# Patient Record
Sex: MALE | Race: BLACK OR AFRICAN AMERICAN | Employment: OTHER | URBAN - METROPOLITAN AREA
[De-identification: names, ages, dates, MRNs, and addresses within clinical notes are randomized per-mention and may not be internally consistent; named-entity substitution may affect disease eponyms.]

---

## 2017-01-17 ENCOUNTER — OFFICE VISIT (OUTPATIENT)
Dept: FAMILY MEDICINE CLINIC | Age: 62
End: 2017-01-17

## 2017-01-17 VITALS
RESPIRATION RATE: 14 BRPM | HEART RATE: 88 BPM | BODY MASS INDEX: 38.31 KG/M2 | HEIGHT: 66 IN | TEMPERATURE: 98.3 F | DIASTOLIC BLOOD PRESSURE: 52 MMHG | OXYGEN SATURATION: 100 % | SYSTOLIC BLOOD PRESSURE: 113 MMHG | WEIGHT: 238.4 LBS

## 2017-01-17 DIAGNOSIS — I10 ESSENTIAL HYPERTENSION: ICD-10-CM

## 2017-01-17 DIAGNOSIS — E11.59 TYPE 2 DIABETES MELLITUS WITH OTHER CIRCULATORY COMPLICATION, WITH LONG-TERM CURRENT USE OF INSULIN (HCC): Primary | ICD-10-CM

## 2017-01-17 DIAGNOSIS — Z79.4 TYPE 2 DIABETES MELLITUS WITH OTHER CIRCULATORY COMPLICATION, WITH LONG-TERM CURRENT USE OF INSULIN (HCC): Primary | ICD-10-CM

## 2017-01-17 LAB — HBA1C MFR BLD HPLC: 4.9 %

## 2017-01-17 NOTE — PROGRESS NOTES
HISTORY OF PRESENT ILLNESS  Roxanna Gutiérrez is a 64 y.o. male. HPI In for recheck. Needs refill of levemir. Needs diabetes checked. Blood sugars have been in the low 100s. NO hypogylcemic episodes. Sores on legs have healed up. ROS    Physical Exam   Constitutional: He appears well-developed and well-nourished. HENT:   Right Ear: External ear normal.   Left Ear: External ear normal.   Mouth/Throat: Oropharynx is clear and moist.   Neck: No thyromegaly present. Cardiovascular: Normal rate, regular rhythm, normal heart sounds and intact distal pulses. Pulmonary/Chest: Effort normal and breath sounds normal. No respiratory distress. He has no wheezes. Abdominal: Soft. Bowel sounds are normal. He exhibits no distension and no mass. There is no tenderness. There is no guarding. Musculoskeletal: Normal range of motion. He exhibits no edema. Lymphadenopathy:     He has no cervical adenopathy. Nursing note and vitals reviewed. ASSESSMENT and PLAN  Orders Placed This Encounter    CBC WITH AUTOMATED DIFF    METABOLIC PANEL, COMPREHENSIVE    LIPID PANEL    AMB POC HEMOGLOBIN A1C     DIABETES FOOT EXAM    insulin detemir (LEVEMIR FLEXTOUCH) 100 unit/mL (3 mL) inpn    pneumococcal 13 clay conj dip (PREVNAR-13) 0.5 mL syrg injection    insulin detemir (LEVEMIR FLEXTOUCH) 100 unit/mL (3 mL) inpn     Larry was seen today for hypertension, diabetes and cholesterol problem. Diagnoses and all orders for this visit:    Type 2 diabetes mellitus with other circulatory complication, with long-term current use of insulin ([facility])  -     CBC WITH AUTOMATED DIFF  -     METABOLIC PANEL, COMPREHENSIVE  -     LIPID PANEL  -     AMB POC HEMOGLOBIN A1C  -     Cancel: MICROALBUMIN, UR, RAND W/ MICROALBUMIN/CREA RATIO  -      DIABETES FOOT EXAM    Essential hypertension    Other orders  -     insulin detemir (LEVEMIR FLEXTOUCH) 100 unit/mL (3 mL) inpn; 3 units if blood sugar over 150.   -     pneumococcal 13 clay conj dip (PREVNAR-13) 0.5 mL syrg injection; 0.5 mL by IntraMUSCular route once for 1 dose. -     insulin detemir (LEVEMIR FLEXTOUCH) 100 unit/mL (3 mL) inpn; 3 units sc if blood sugar over 150      Follow-up Disposition:  Return in about 6 months (around 7/17/2017).

## 2017-01-17 NOTE — PROGRESS NOTES
Chief Complaint   Patient presents with    Hypertension    Diabetes    Cholesterol Problem     1. Have you been to the ER, urgent care clinic since your last visit? Hospitalized since your last visit? No    2. Have you seen or consulted any other health care providers outside of the [address] [place] [address] since your last visit? Include any pap smears or colon screening.  No  [facility]    Health Maintenance Due   Topic Date Due    Hepatitis C Screening  1955    Pneumococcal 19-64 Highest Risk (2 of 3 - PCV13) 01/01/2010    ZOSTER VACCINE AGE 60>  08/05/2015    EYE EXAM RETINAL OR DILATED Q1  03/19/2016    MICROALBUMIN Q1  05/27/2016    LIPID PANEL Q1  05/27/2016    HEMOGLOBIN A1C Q6M  02/01/2017

## 2017-01-17 NOTE — MR AVS SNAPSHOT
Visit Information Date & Time Provider Department Dept. Phone Encounter #  
 1/17/2017  2:00 PM April Snyder MD [facility] [phone] [id number] Follow-up Instructions Return in about 6 months (around 7/17/2017). Upcoming Health Maintenance Date Due Hepatitis C Screening 1955 Pneumococcal 19-64 Highest Risk (2 of 3 - PCV13) 1/1/2010 ZOSTER VACCINE AGE 60> 8/5/2015 EYE EXAM RETINAL OR DILATED Q1 3/19/2016 MICROALBUMIN Q1 5/27/2016 LIPID PANEL Q1 5/27/2016 HEMOGLOBIN A1C Q6M 2/1/2017 GLAUCOMA SCREENING Q2Y 3/19/2017 FOOT EXAM Q1 6/21/2017 MEDICARE YEARLY EXAM 6/22/2017 DTaP/Tdap/Td series (2 - Td) 4/11/2026 COLONOSCOPY 4/16/2026 Allergies as of 1/17/2017  Review Complete On: 1/17/2017 By: April Snyder MD  
  
 Severity Noted Reaction Type Reactions Clonidine  06/01/2015    Other (comments)  
 fatigue Lisinopril  10/18/2011   Side Effect Other (comments)  
 headache Losartan  08/05/2011    Other (comments) Fatigue Statins-hmg-coa Reductase Inhibitors  02/04/2016    Other (comments)  
 transaminitis Welchol [Colesevelam]  08/05/2011   Side Effect Other (comments) Fatigue Zocor [Simvastatin]  08/13/2010    Myalgia  
 headache Current Immunizations  Reviewed on 6/21/2016 Name Date Influenza Vaccine 11/7/2013 Influenza Vaccine (Quad) PF 9/12/2016, 2/4/2016, 12/29/2014 Influenza Vaccine Split 11/2/2012, 12/13/2010 Influenza Vaccine Whole 9/16/2011 Pneumococcal Vaccine (Unspecified Type) 1/1/2009 Tdap 4/11/2016 Not reviewed this visit You Were Diagnosed With   
  
 Codes Comments Type 2 diabetes mellitus with other circulatory complication, with long-term current use of insulin (HCC)    -  Primary ICD-10-CM: E11.59, Z79.4 Essential hypertension     ICD-10-CM: I10 
ICD-9-CM: 401.9 Vitals BP Pulse Temp Resp Height(growth percentile) Weight(growth percentile) 113/52 88 98.3 °F (36.8 °C) (Oral) 14 5' 6\" (1.676 m) 238 lb 6.4 oz (108.1 kg) SpO2 BMI Smoking Status 100% 38.48 kg/m2 Never Smoker Vitals History BMI and BSA Data Body Mass Index Body Surface Area  
 38.48 kg/m 2 2.24 m 2 Preferred Pharmacy Pharmacy Name Phone [facility] [address] [place] [address], [facility] [address] [phone] Your Updated Medication List  
  
   
This list is accurate as of: 1/17/17  2:39 PM.  Always use your most recent med list. amLODIPine 10 mg tablet Commonly known as:  Ivy Croon Take 1 Tab by mouth daily. For blood pressure Artificial Tears(Glycerin-PEG) 1-0.3 % Drop Generic drug:  propylene glycol-glycerin  
  
 aspirin 81 mg chewable tablet  
  
 atorvastatin 20 mg tablet Commonly known as:  LIPITOR Take 1 Tab by mouth daily. Blood-Glucose Meter Misc  
1 Each by Does Not Apply route Before breakfast and dinner. One touch Meter  
  
 cloNIDine HCl 0.1 mg tablet Commonly known as:  CATAPRES  
TAKE 1 TABLET BY MOUTH TWICE DAILY FOR BLOOD PRESSURE  
  
 clopidogrel 75 mg Tab Commonly known as:  PLAVIX Take 1 Tab by mouth daily. collagenase 250 unit/gram ointment Commonly known as:  SANTYL Apply  to affected area daily. glucose blood VI test strips strip Commonly known as:  ASCENSIA AUTODISC VI, ONE TOUCH ULTRA TEST VI Check blood sugar daily * insulin detemir 100 unit/mL (3 mL) Inpn Commonly known as:  [place]  
3 units if blood sugar over 150. * insulin detemir 100 unit/mL (3 mL) Inpn Commonly known as:  LEVEMIR FLEXTOUCH  
3 units sc if blood sugar over 150 Insulin Needles (Disposable) 32 gauge x 5/32\" Ndle Commonly known as:  Kim Pen Needle BD ultra-fine III short pen needle 31G (BLUE)  Use as directed. Insulin Needles (Disposable) 31 gauge x \" Ndle Commonly known as:  BD INSULIN PEN NEEDLE UF SHORT  
USE AS DIRECTED Lancets Misc Check blood sugar twice a day LYRICA 75 mg capsule Generic drug:  pregabalin TAKE 1 CAPSULE BY MOUTH DAILY  
  
 metoprolol succinate 25 mg XL tablet Commonly known as:  TOPROL-XL Take 1 Tab by mouth daily. metoprolol tartrate 25 mg tablet Commonly known as:  LOPRESSOR  
1 po bid NEPHRO-MERON 0.8 mg Tab tablet Generic drug:  b complex-vitamin c-folic acid 0.8 mg  
  
 pneumococcal 13 clay conj dip 0.5 mL Syrg injection Commonly known as:  PREVNAR-13  
0.5 mL by IntraMUSCular route once for 1 dose. * Notice: This list has 2 medication(s) that are the same as other medications prescribed for you. Read the directions carefully, and ask your doctor or other care provider to review them with you. Prescriptions Printed Refills  
 pneumococcal 13 clay conj dip (PREVNAR-13) 0.5 mL syrg injection 0 Si.5 mL by IntraMUSCular route once for 1 dose. Class: Print Route: IntraMUSCular Prescriptions Sent to Pharmacy Refills  
 insulin detemir (LEVEMIR FLEXTOUCH) 100 unit/mL (3 mL) inpn 1 Sig: 3 units if blood sugar over 150. Class: Normal  
 Pharmacy: [facility] [address] [place] [address] Ph #: [phone] insulin detemir (LEVEMIR FLEXTOUCH) 100 unit/mL (3 mL) inpn 12 Sig: 3 units sc if blood sugar over 150 Class: Normal  
 Pharmacy: [facility] [address] [place] [address] Ph #: [phone] We Performed the Following AMB POC HEMOGLOBIN A1C [51364 CPT(R)] CBC WITH AUTOMATED DIFF [58357 CPT(R)]  DIABETES FOOT EXAM [7 Custom] LIPID PANEL [57251 CPT(R)] METABOLIC PANEL, COMPREHENSIVE [31302 CPT(R)] MICROALBUMIN, UR, RAND W/ MICROALBUMIN/CREA RATIO G668171 CPT(R)] Follow-up Instructions Return in about 6 months (around 7/17/2017). Introducing [facility]! [facility] introduces [employer] patient portal. Now you can access parts of your medical record, email your doctor's office, and request medication refills online. 1. In your internet browser, go to [email or web address] 2. Click on the First Time User? Click Here link in the Sign In box. You will see the New Member Sign Up page. 3. Enter your Startups Access Code exactly as it appears below. You will not need to use this code after youve completed the sign-up process. If you do not sign up before the expiration date, you must request a new code. · Startups Access Code: [email or web address] Expires: 4/17/2017  2:37 PM 
 
4. Enter the last four digits of your Social Security Number (xxxx) and Date of Birth (mm/dd/yyyy) as indicated and click Submit. You will be taken to the next sign-up page. 5. Create a Startups ID. This will be your Startups login ID and cannot be changed, so think of one that is secure and easy to remember. 6. Create a Startups password. You can change your password at any time. 7. Enter your Password Reset Question and Answer. This can be used at a later time if you forget your password. 8. Enter your e-mail address. You will receive e-mail notification when new information is available in [address]. 9. Click Sign Up. You can now view and download portions of your medical record. 10. Click the Download Summary menu link to download a portable copy of your medical information. If you have questions, please visit the Frequently Asked Questions section of the Startups website. Remember, Startups is NOT to be used for urgent needs. For medical emergencies, dial 911. Now available from your iPhone and Android! Please provide this summary of care documentation to your next provider. Your primary care clinician is listed as Jodeane Spatz. If you have any questions after today's visit, please call [phone].

## 2017-01-18 LAB
ALBUMIN SERPL-MCNC: 4.8 G/DL (ref 3.6–4.8)
ALBUMIN/GLOB SERPL: 1.6 {RATIO} (ref 1.1–2.5)
ALP SERPL-CCNC: 57 IU/L (ref 39–117)
ALT SERPL-CCNC: 12 IU/L (ref 0–44)
AST SERPL-CCNC: 11 IU/L (ref 0–40)
BASOPHILS # BLD AUTO: 0 X10E3/UL (ref 0–0.2)
BASOPHILS NFR BLD AUTO: 1 %
BILIRUB SERPL-MCNC: 0.3 MG/DL (ref 0–1.2)
BUN SERPL-MCNC: 60 MG/DL (ref 8–27)
BUN/CREAT SERPL: 8 (ref 10–22)
CALCIUM SERPL-MCNC: 9 MG/DL (ref 8.6–10.2)
CHLORIDE SERPL-SCNC: 97 MMOL/L (ref 96–106)
CHOLEST SERPL-MCNC: 182 MG/DL (ref 100–199)
CO2 SERPL-SCNC: 22 MMOL/L (ref 18–29)
CREAT SERPL-MCNC: 7.99 MG/DL (ref 0.76–1.27)
EOSINOPHIL # BLD AUTO: 0.2 X10E3/UL (ref 0–0.4)
EOSINOPHIL NFR BLD AUTO: 4 %
ERYTHROCYTE [DISTWIDTH] IN BLOOD BY AUTOMATED COUNT: 12.9 % (ref 12.3–15.4)
GLOBULIN SER CALC-MCNC: 3 G/DL (ref 1.5–4.5)
GLUCOSE SERPL-MCNC: 87 MG/DL (ref 65–99)
HCT VFR BLD AUTO: 34.4 % (ref 37.5–51)
HDLC SERPL-MCNC: 41 MG/DL
HGB BLD-MCNC: 11.4 G/DL (ref 12.6–17.7)
IMM GRANULOCYTES # BLD: 0 X10E3/UL (ref 0–0.1)
IMM GRANULOCYTES NFR BLD: 0 %
INTERPRETATION, 910389: NORMAL
INTERPRETATION: NORMAL
LDLC SERPL CALC-MCNC: 117 MG/DL (ref 0–99)
LYMPHOCYTES # BLD AUTO: 2.3 X10E3/UL (ref 0.7–3.1)
LYMPHOCYTES NFR BLD AUTO: 40 %
MCH RBC QN AUTO: 33.5 PG (ref 26.6–33)
MCHC RBC AUTO-ENTMCNC: 33.1 G/DL (ref 31.5–35.7)
MCV RBC AUTO: 101 FL (ref 79–97)
MONOCYTES # BLD AUTO: 0.6 X10E3/UL (ref 0.1–0.9)
MONOCYTES NFR BLD AUTO: 10 %
NEUTROPHILS # BLD AUTO: 2.6 X10E3/UL (ref 1.4–7)
NEUTROPHILS NFR BLD AUTO: 45 %
PLATELET # BLD AUTO: 208 X10E3/UL (ref 150–379)
POTASSIUM SERPL-SCNC: 6.2 MMOL/L (ref 3.5–5.2)
PROT SERPL-MCNC: 7.8 G/DL (ref 6–8.5)
RBC # BLD AUTO: 3.4 X10E6/UL (ref 4.14–5.8)
SODIUM SERPL-SCNC: 142 MMOL/L (ref 134–144)
TRIGL SERPL-MCNC: 120 MG/DL (ref 0–149)
VLDLC SERPL CALC-MCNC: 24 MG/DL (ref 5–40)
WBC # BLD AUTO: 5.7 X10E3/UL (ref 3.4–10.8)

## 2017-01-20 NOTE — PROGRESS NOTES
pc with pt. Potassium has been a problem, was told this 4 days ago at dialysis. Renal is managing this.

## 2017-01-24 ENCOUNTER — TELEPHONE (OUTPATIENT)
Dept: FAMILY MEDICINE CLINIC | Age: 62
End: 2017-01-24

## 2017-07-18 ENCOUNTER — OFFICE VISIT (OUTPATIENT)
Dept: FAMILY MEDICINE CLINIC | Age: 62
End: 2017-07-18

## 2017-07-18 ENCOUNTER — PATIENT OUTREACH (OUTPATIENT)
Dept: FAMILY MEDICINE CLINIC | Age: 62
End: 2017-07-18

## 2017-07-18 VITALS
WEIGHT: 243.4 LBS | DIASTOLIC BLOOD PRESSURE: 47 MMHG | HEART RATE: 78 BPM | TEMPERATURE: 98 F | BODY MASS INDEX: 39.12 KG/M2 | HEIGHT: 66 IN | OXYGEN SATURATION: 99 % | RESPIRATION RATE: 14 BRPM | SYSTOLIC BLOOD PRESSURE: 101 MMHG

## 2017-07-18 DIAGNOSIS — Z13.39 SCREENING FOR ALCOHOLISM: ICD-10-CM

## 2017-07-18 DIAGNOSIS — Z79.4 TYPE 2 DIABETES MELLITUS WITH OTHER CIRCULATORY COMPLICATION, WITH LONG-TERM CURRENT USE OF INSULIN (HCC): Primary | ICD-10-CM

## 2017-07-18 DIAGNOSIS — Z00.00 ROUTINE GENERAL MEDICAL EXAMINATION AT A HEALTH CARE FACILITY: ICD-10-CM

## 2017-07-18 DIAGNOSIS — E11.59 TYPE 2 DIABETES MELLITUS WITH OTHER CIRCULATORY COMPLICATION, WITH LONG-TERM CURRENT USE OF INSULIN (HCC): Primary | ICD-10-CM

## 2017-07-18 DIAGNOSIS — Z11.59 NEED FOR HEPATITIS C SCREENING TEST: ICD-10-CM

## 2017-07-18 LAB — HBA1C MFR BLD HPLC: 5.3 %

## 2017-07-18 RX ORDER — GUAIFENESIN 100 MG/5ML
162 LIQUID (ML) ORAL DAILY
Qty: 60 TAB | Refills: 12
Start: 2017-07-18

## 2017-07-18 RX ORDER — ATORVASTATIN CALCIUM 40 MG/1
TABLET, FILM COATED ORAL
Refills: 3 | COMMUNITY
Start: 2017-06-26 | End: 2017-09-06 | Stop reason: SDUPTHER

## 2017-07-18 RX ORDER — SEVELAMER CARBONATE 2400 MG/1
POWDER, FOR SUSPENSION ORAL
Refills: 1 | COMMUNITY
Start: 2017-06-12 | End: 2018-01-16 | Stop reason: ALTCHOICE

## 2017-07-18 RX ORDER — SODIUM POLYSTYRENE SULFONATE 15 G/60ML
SUSPENSION ORAL; RECTAL
Refills: 0 | COMMUNITY
Start: 2017-04-14 | End: 2018-01-16 | Stop reason: ALTCHOICE

## 2017-07-18 RX ORDER — ASPIRIN 81 MG/1
TABLET ORAL
Refills: 11 | COMMUNITY
Start: 2017-04-13 | End: 2017-07-18 | Stop reason: DRUGHIGH

## 2017-07-18 RX ORDER — PREGABALIN 150 MG/1
CAPSULE ORAL
Qty: 60 CAP | Refills: 5 | Status: SHIPPED | OUTPATIENT
Start: 2017-07-18 | End: 2018-01-30 | Stop reason: SDUPTHER

## 2017-07-18 NOTE — PROGRESS NOTES
Patient stated he had Prevnar 13 and Zoster Vaccine completed [address]   Had Prevnar 13 4/3/2017 and no record of Zoster Vaccine being given

## 2017-07-18 NOTE — PROGRESS NOTES
HISTORY OF PRESENT ILLNESS  Ana Bhatt is a 64 y.o. male. HPI In for diabetes check. Has been feeling well. Has gained 5 lbs since last seen. No complaints of chest pain, shortness of breath, TIAs, claudication or edema. Still taking dialysis 3 days a week. Still has numbness in feet, not helped that much by Lyrica. Also has numbness in fingertips. ROS    Physical Exam   Constitutional: He appears well-developed and well-nourished. HENT:   Right Ear: External ear normal.   Left Ear: External ear normal.   Mouth/Throat: Oropharynx is clear and moist.   Neck: No thyromegaly present. Cardiovascular: Normal rate, regular rhythm, normal heart sounds and intact distal pulses. Pulmonary/Chest: Effort normal and breath sounds normal. No respiratory distress. He has no wheezes. Abdominal: Soft. Bowel sounds are normal. He exhibits no distension and no mass. There is no tenderness. There is no guarding. Musculoskeletal: Normal range of motion. He exhibits no edema. Lymphadenopathy:     He has no cervical adenopathy. Nursing note and vitals reviewed. ASSESSMENT and PLAN  Orders Placed This Encounter    CBC WITH AUTOMATED DIFF    METABOLIC PANEL, COMPREHENSIVE    LIPID PANEL    HEPATITIS C AB    AMB POC HEMOGLOBIN A1C     DIABETES FOOT EXAM    pregabalin (LYRICA) 150 mg capsule    aspirin 81 mg chewable tablet     Larry was seen today for hypertension, diabetes, cholesterol problem and annual wellness visit.     Diagnoses and all orders for this visit:    Type 2 diabetes mellitus with other circulatory complication, with long-term current use of insulin (HCC)  -     AMB POC HEMOGLOBIN A1C  -     CBC WITH AUTOMATED DIFF  -     METABOLIC PANEL, COMPREHENSIVE  -      DIABETES FOOT EXAM  -     LIPID PANEL    Need for hepatitis C screening test  -     HEPATITIS C AB    Routine general medical examination at a health care facility    Screening for alcoholism    Other orders  -     pregabalin (LYRICA) 150 mg capsule; One tab daily for 2 weeks, then one tab twice daily. For pain in feet  -     aspirin 81 mg chewable tablet; Take 2 Tabs by mouth daily. Follow-up Disposition:  Return in about 3 months (around 10/18/2017).

## 2017-07-18 NOTE — PROGRESS NOTES
This is a Subsequent Medicare Annual Wellness Visit providing Personalized Prevention Plan Services (PPPS) (Performed 12 months after initial AWV and PPPS )    I have reviewed the patient's medical history in detail and updated the computerized patient record. History     Past Medical History:   Diagnosis Date    CVA, old, speech/language deficit 2/4/2016    Diabetes [facility])     TYPE 2    DJD (degenerative joint disease)     KNEES    ESRD (end stage renal disease) ([facility] [address] 2/4/2016    Hypercholesterolemia     Hypertension     Proteinuria     S/P CABG (coronary artery bypass graft) 2/4/2016    Unspecified sleep apnea     DOES NOT USE CPAP      Past Surgical History:   Procedure Laterality Date    HX HIP REPLACEMENT  1/2005    left  hip    HX HIP REPLACEMENT      right hip    HX ORTHOPAEDIC      BILAT THR     Current Outpatient Prescriptions   Medication Sig Dispense Refill    aspirin delayed-release 81 mg tablet TK 1 T PO  D WITH BREAKFAST FOR 30 DAYS  11    atorvastatin (LIPITOR) 40 mg tablet TK 1 T PO HS  3    RENVELA 2.4 gram pwpk oral powder TAKE 1 PACKET WITH EACH MEAL  TID  1    LYRICA 75 mg capsule TAKE ONE CAPSULE BY MOUTH EVERY DAY AS DIRECTED 30 Cap 5    insulin detemir (LEVEMIR FLEXTOUCH) 100 unit/mL (3 mL) inpn 3 units if blood sugar over 150. 1 Pen 1    insulin detemir (LEVEMIR FLEXTOUCH) 100 unit/mL (3 mL) inpn 3 units sc if blood sugar over 150 1 Pen 12    atorvastatin (LIPITOR) 20 mg tablet Take 1 Tab by mouth daily. 30 Tab 5    metoprolol tartrate (LOPRESSOR) 25 mg tablet 1 po bid 60 Tab 5    clopidogrel (PLAVIX) 75 mg tab Take 1 Tab by mouth daily. 30 Tab 5    amLODIPine (NORVASC) 10 mg tablet Take 1 Tab by mouth daily. For blood pressure 30 Tab 12    cloNIDine HCl (CATAPRES) 0.1 mg tablet TAKE 1 TABLET BY MOUTH TWICE DAILY FOR BLOOD PRESSURE 60 Tab 5    NEPHRO-MERON 0.8 mg tab tablet   0    collagenase (SANTYL) 250 unit/gram ointment Apply  to affected area daily.  15 g 2  glucose blood VI test strips (ASCENSIA AUTODISC VI, ONE TOUCH ULTRA TEST VI) strip Check blood sugar daily 100 Strip 3    aspirin 81 mg chewable tablet   0    ARTIFICIAL TEARS,GLYCERIN-PEG, 1-0.3 % drop   0    Insulin Needles, Disposable, (BD INSULIN PEN NEEDLE UF SHORT) 31 gauge x 5/16\" ndle USE AS DIRECTED 100 Pen Needle 12    Blood-Glucose Meter misc 1 Each by Does Not Apply route Before breakfast and dinner. One touch Meter 1 Each 0    Lancets misc Check blood sugar twice a day 1 Package 11    Insulin Needles, Disposable, (LONNIE PEN NEEDLE) 32 x 5/32 \" Ndle BD ultra-fine III short pen needle 31G (BLUE)    Use as directed.  100 Each 12    KIONEX, WITH SORBITOL, 15-19.3 gram/60 mL susp   0     Allergies   Allergen Reactions    Clonidine Other (comments)     fatigue    Lisinopril Other (comments)     headache    Losartan Other (comments)     Fatigue      Statins-Hmg-Coa Reductase Inhibitors Other (comments)     transaminitis    Welchol [Colesevelam] Other (comments)     Fatigue      Zocor [Simvastatin] Myalgia     headache     Family History   Problem Relation Age of Onset    Diabetes Mother     Diabetes Father     Hypertension Father     Elevated Lipids Father     Stroke Father     Stroke Paternal Grandfather      Social History   Substance Use Topics    Smoking status: Never Smoker    Smokeless tobacco: Never Used    Alcohol use No     Patient Active Problem List   Diagnosis Code    Hypertension I10    Diabetes ([facility] [address] E11.9    Hypercholesterolemia E78.00    Neuropathy ([facility] [address] G62.9    Sleep disorder G47.9    S/P CABG (coronary artery bypass graft) Z95.1    ESRD (end stage renal disease) ([facility] [address] N18.6    CVA, old, speech/language deficit I69.328       Depression Risk Factor Screening:     PHQ over the last two weeks 7/18/2017   Little interest or pleasure in doing things Not at all   Feeling down, depressed or hopeless Not at all   Total Score PHQ 2 0     Alcohol Risk Factor Screening: On any occasion during the past 3 months, have you had more than 4 drinks containing alcohol? No    Do you average more than 14 drinks per week? No        Functional Ability and Level of Safety:     Hearing Loss   normal-to-mild    Activities of Daily Living   Partial assistance. Requires assistance with: ambulation and no ADLs    Fall Risk   No flowsheet data found. Abuse Screen   Patient is not abused    Review of Systems   Not required    Physical Examination     Evaluation of Cognitive Function:  Mood/affect:  happy  Appearance: age appropriate  Family member/caregiver input: Ms Moses RUFFIN No exam performed today, As directed by Dr Lin De La O. Patient Care Team:  Eduardo Talavera MD as PCP - [address] [place] [address] Nurse Navigator    Advice/Referrals/Counseling   Education and counseling provided:  End-of-Life planning (with patient's consent) Advanced Directives discussed with patient, given Your Right to Decide booklet and Advanced Directive paperwork to completed and bring back for chart. Assessment/Plan   As directed by Dr Lin De La O.

## 2017-07-18 NOTE — MR AVS SNAPSHOT
Visit Information Date & Time Provider Department Dept. Phone Encounter #  
 7/18/2017  3:15 PM Presley Rene MD [facility] [phone] [id number] Follow-up Instructions Return in about 3 months (around 10/18/2017). Upcoming Health Maintenance Date Due Hepatitis C Screening 1955 Pneumococcal 19-64 Highest Risk (2 of 3 - PCV13) 1/1/2010 ZOSTER VACCINE AGE 60> 8/5/2015 MICROALBUMIN Q1 5/27/2016 MEDICARE YEARLY EXAM 6/22/2017 HEMOGLOBIN A1C Q6M 7/17/2017 INFLUENZA AGE 9 TO ADULT 8/1/2017 EYE EXAM RETINAL OR DILATED Q1 1/10/2018 FOOT EXAM Q1 1/17/2018 LIPID PANEL Q1 1/17/2018 GLAUCOMA SCREENING Q2Y 1/10/2019 DTaP/Tdap/Td series (2 - Td) 4/11/2026 COLONOSCOPY 4/16/2026 Allergies as of 7/18/2017  Review Complete On: 7/18/2017 By: Presley Rene MD  
  
 Severity Noted Reaction Type Reactions Clonidine  06/01/2015    Other (comments)  
 fatigue Lisinopril  10/18/2011   Side Effect Other (comments)  
 headache Losartan  08/05/2011    Other (comments) Fatigue Statins-hmg-coa Reductase Inhibitors  02/04/2016    Other (comments)  
 transaminitis Welchol [Colesevelam]  08/05/2011   Side Effect Other (comments) Fatigue Zocor [Simvastatin]  08/13/2010    Myalgia  
 headache Current Immunizations  Reviewed on 6/21/2016 Name Date Influenza Vaccine 11/7/2013 Influenza Vaccine (Quad) PF 9/12/2016, 2/4/2016, 12/29/2014 Influenza Vaccine Split 11/2/2012, 12/13/2010 Influenza Vaccine Whole 9/16/2011 Pneumococcal Vaccine (Unspecified Type) 1/1/2009 Tdap 4/11/2016 Not reviewed this visit You Were Diagnosed With   
  
 Codes Comments Type 2 diabetes mellitus with other circulatory complication, with long-term current use of insulin (HCC)    -  Primary ICD-10-CM: E11.59, Z79.4 ICD-9-CM: 250.70, V58.67  Need for hepatitis C screening test     ICD-10-CM: Z11.59 
ICD-9-CM: V73.89   
 Routine general medical examination at a health care facility     ICD-10-CM: Z00.00 ICD-9-CM: V70.0 Screening for alcoholism     ICD-10-CM: Z13.89 ICD-9-CM: V79.1 Vitals BP Pulse Temp Resp Height(growth percentile) Weight(growth percentile) 101/47 78 98 °F (36.7 °C) (Oral) 14 5' 6\" (1.676 m) 243 lb 6.4 oz (110.4 kg) SpO2 BMI Smoking Status 99% 39.29 kg/m2 Never Smoker Vitals History BMI and BSA Data Body Mass Index Body Surface Area  
 39.29 kg/m 2 2.27 m 2 Preferred Pharmacy Pharmacy Name Phone [facility] [address] [place] [address], [facility] [address] [phone] Your Updated Medication List  
  
   
This list is accurate as of: 7/18/17  4:10 PM.  Always use your most recent med list. amLODIPine 10 mg tablet Commonly known as:  Tillman Barges Take 1 Tab by mouth daily. For blood pressure Artificial Tears(Glycerin-PEG) 1-0.3 % Drop Generic drug:  propylene glycol-glycerin  
  
 aspirin 81 mg chewable tablet Take 2 Tabs by mouth daily. atorvastatin 40 mg tablet Commonly known as:  LIPITOR TK 1 T PO HS  
  
 cloNIDine HCl 0.1 mg tablet Commonly known as:  CATAPRES  
TAKE 1 TABLET BY MOUTH TWICE DAILY FOR BLOOD PRESSURE  
  
 clopidogrel 75 mg Tab Commonly known as:  PLAVIX Take 1 Tab by mouth daily. collagenase 250 unit/gram ointment Commonly known as:  SANTYL Apply  to affected area daily. glucose blood VI test strips strip Commonly known as:  ASCENSIA AUTODISC VI, ONE TOUCH ULTRA TEST VI Check blood sugar daily KIONEX (WITH SORBITOL) 15-19.3 gram/60 mL Susp Generic drug:  sodium polystyrene sulfon-sorb  
  
 metoprolol tartrate 25 mg tablet Commonly known as:  LOPRESSOR  
1 po bid NEPHRO-MERON 0.8 mg Tab tablet Generic drug:  b complex-vitamin c-folic acid 0.8 mg  
  
 pregabalin 150 mg capsule Commonly known as:  Siobhan [place] One tab daily for 2 weeks, then one tab twice daily. For pain in feet RENVELA 2.4 gram Pwpk oral powder Generic drug:  sevelamer carbonate TAKE 1 PACKET WITH EACH MEAL  TID Prescriptions Printed Refills  
 pregabalin (LYRICA) 150 mg capsule 5 Sig: One tab daily for 2 weeks, then one tab twice daily. For pain in feet Class: Print We Performed the Following AMB POC HEMOGLOBIN A1C [13979 CPT(R)] CBC WITH AUTOMATED DIFF [28959 CPT(R)] HEPATITIS C AB [87885 CPT(R)]  DIABETES FOOT EXAM [HM7 Custom] LIPID PANEL [11103 CPT(R)] METABOLIC PANEL, COMPREHENSIVE [07433 CPT(R)] Follow-up Instructions Return in about 3 months (around 10/18/2017). Patient Instructions Medicare Part B Preventive Services Limitations Recommendation Scheduled Bone Mass Measurement 
(age 72 & older, biennial) Requires diagnosis related to osteoporosis or estrogen deficiency. Biennial benefit unless patient has history of long-term glucocorticoid tx or baseline is needed because initial test was by other method  N/A Cardiovascular Screening Blood Tests (every 5 years) Total cholesterol, HDL, Triglycerides Order as a panel if possible  Done 1/2017 Colorectal Cancer Screening 
-Fecal occult blood test (annual) -Flexible sigmoidoscopy (5y) 
-Screening colonoscopy (10y) -Barium Enema   Done 4/2016 Counseling to Prevent Tobacco Use (up to 8 sessions per year) - Counseling greater than 3 and up to 10 minutes - Counseling greater than 10 minutes Patients must be asymptomatic of tobacco-related conditions to receive as preventive service  Non smoker Diabetes Screening Tests (at least every 3 years, Medicare covers annually or at 6-month intervals for prediabetic patients) Fasting blood sugar (FBS) or glucose tolerance test (GTT) Patient must be diagnosed with one of the following: 
-Hypertension, Dyslipidemia, obesity, previous impaired FBS or GTT Or any two of the following: overweight, FH of diabetes, age ? 72, history of gestational diabetes, birth of baby weighing more than 9 pounds  Last A1C was 4.9, on 1/2017 Diabetes Self-Management Training (DSMT) (no USPSTF recommendation) Requires referral by treating physician for patient with diabetes or renal disease. 10 hours of initial DSMT session of no less than 30 minutes each in a continuous 12-month period. 2 hours of follow-up DSMT in subsequent years. N/A Glaucoma Screening (no USPSTF recommendation) Diabetes mellitus, family history, , age 48 or over,  American, age 72 or over  Done 1/2017 Human Immunodeficiency Virus (HIV) Screening (annually for increased risk patients) HIV-1 and HIV-2 by EIA, TIMO, rapid antibody test, or oral mucosa transudate Patient must be at increased risk for HIV infection per USPSTF guidelines or pregnant. Tests covered annually for patients at increased risk. Pregnant patients may receive up to 3 test during pregnancy. Not high risk Medical Nutrition Therapy (MNT) (for diabetes or renal disease not recommended schedule) Requires referral by treating physician for patient with diabetes or renal disease. Can be provided in same year as diabetes self-management training (DSMT), and CMS recommends medical nutrition therapy take place after DSMT. Up to 3 hours for initial year and 2 hours in subsequent years. N/A Prostate Cancer Screening (annually up to age 76) - Digital rectal exam (LALO) - Prostate specific antigen (PSA) Annually (age 48 or over), LALO not paid separately when covered E/M service is provided on same date Men up to age 76 may need a screening blood test for prostate cancer at certain intervals, depending on their personal and family history. This decision is between the patient and his provider. Done 6/2016 and was 0.3 Seasonal Influenza Vaccination (annually)   Fall 2017 Pneumococcal Vaccination (once after 65) States done at [facility] Hepatitis B Vaccinations (if medium/high risk) Medium/high risk factors:  End-stage renal disease, Hemophiliacs who received Factor VIII or IX concentrates, Clients of institutions for the mentally retarded, Persons who live in the same house as a HepB virus carrier, Homosexual men, Illicit injectable drug abusers. Ordered today Shingles Vaccination A shingles vaccine is also recommended once in a lifetime after age 61  Stated he had at [facility] Ultrasound Screening for Abdominal Aortic Aneurysm (AAA) (once) Patient must be referred through IPPE and not have had a screening for abdominal aortic aneurysm before under Medicare. Limited to patients who meet one of the following criteria: 
- Men who are 73-68 years old and have smoked more than 100 cigarettes in their lifetime. 
-Anyone with a FH of AAA 
-Anyone recommended for screening by USPSTF  N/A Introducing [facility] & HEALTH SERVICES! Jurgen Jean introduces [email or web address] patient portal. Now you can access parts of your medical record, email your doctor's office, and request medication refills online. 1. In your internet browser, go to [email or web address] 2. Click on the First Time User? Click Here link in the Sign In box. You will see the New Member Sign Up page. 3. Enter your [email or web address] Access Code exactly as it appears below. You will not need to use this code after youve completed the sign-up process. If you do not sign up before the expiration date, you must request a new code. · [email or web address] Access Code: [email or web address] Expires: 10/16/2017  3:54 PM 
 
4. Enter the last four digits of your Social Security Number (xxxx) and Date of Birth (mm/dd/yyyy) as indicated and click Submit. You will be taken to the next sign-up page. 5. Create a [email or web address] ID. This will be your [email or web address] login ID and cannot be changed, so think of one that is secure and easy to remember. 6. Create a Elliptic password. You can change your password at any time. 7. Enter your Password Reset Question and Answer. This can be used at a later time if you forget your password. 8. Enter your e-mail address. You will receive e-mail notification when new information is available in [address] 9. Click Sign Up. You can now view and download portions of your medical record. 10. Click the Download Summary menu link to download a portable copy of your medical information. If you have questions, please visit the Frequently Asked Questions section of the [employer] website. Remember, Ell[employer]ic is NOT to be used for urgent needs. For medical emergencies, dial 911. Now available from your iPhone and Android! Please provide this summary of care documentation to your next provider. Your primary care clinician is listed as Perri Crowder. If you have any questions after today's visit, please call [phone].

## 2017-07-18 NOTE — PROGRESS NOTES
Chief Complaint   Patient presents with    Hypertension    Diabetes    Cholesterol Problem     1. Have you been to the ER, urgent care clinic since your last visit? Hospitalized since your last visit? No    2. Have you seen or consulted any other health care providers outside of the [address] [place] [address] since your last visit? Include any pap smears or colon screening.  No   Health Maintenance Due   Topic Date Due    Hepatitis C Screening  1955    Pneumococcal 19-64 Highest Risk (2 of 3 - PCV13) 01/01/2010    ZOSTER VACCINE AGE 60>  08/05/2015    MICROALBUMIN Q1  05/27/2016    MEDICARE YEARLY EXAM  06/22/2017    HEMOGLOBIN A1C Q6M  07/17/2017

## 2017-07-18 NOTE — PATIENT INSTRUCTIONS
Medicare Part B Preventive Services Limitations Recommendation Scheduled   Bone Mass Measurement  (age 72 & older, biennial) Requires diagnosis related to osteoporosis or estrogen deficiency. Biennial benefit unless patient has history of long-term glucocorticoid tx or baseline is needed because initial test was by other method  N/A   Cardiovascular Screening Blood Tests (every 5 years)  Total cholesterol, HDL, Triglycerides Order as a panel if possible  Done 1/2017   Colorectal Cancer Screening  -Fecal occult blood test (annual)  -Flexible sigmoidoscopy (5y)  -Screening colonoscopy (10y)  -Barium Enema   Done 4/2016   Counseling to Prevent Tobacco Use (up to 8 sessions per year)  - Counseling greater than 3 and up to 10 minutes  - Counseling greater than 10 minutes Patients must be asymptomatic of tobacco-related conditions to receive as preventive service  Non smoker   Diabetes Screening Tests (at least every 3 years, Medicare covers annually or at 6-month intervals for prediabetic patients)    Fasting blood sugar (FBS) or glucose tolerance test (GTT) Patient must be diagnosed with one of the following:  -Hypertension, Dyslipidemia, obesity, previous impaired FBS or GTT  Or any two of the following: overweight, FH of diabetes, age ? 72, history of gestational diabetes, birth of baby weighing more than 9 pounds  Last A1C was 4.9, on 1/2017   Diabetes Self-Management Training (DSMT) (no USPSTF recommendation) Requires referral by treating physician for patient with diabetes or renal disease. 10 hours of initial DSMT session of no less than 30 minutes each in a continuous 12-month period. 2 hours of follow-up DSMT in subsequent years.   N/A   Glaucoma Screening (no USPSTF recommendation) Diabetes mellitus, family history, , age 48 or over,  American, age 72 or over  Done 1/2017   Human Immunodeficiency Virus (HIV) Screening (annually for increased risk patients)  HIV-1 and HIV-2 by EIA, TIMO, rapid antibody test, or oral mucosa transudate Patient must be at increased risk for HIV infection per USPSTF guidelines or pregnant. Tests covered annually for patients at increased risk. Pregnant patients may receive up to 3 test during pregnancy. Not high risk   Medical Nutrition Therapy (MNT) (for diabetes or renal disease not recommended schedule) Requires referral by treating physician for patient with diabetes or renal disease. Can be provided in same year as diabetes self-management training (DSMT), and CMS recommends medical nutrition therapy take place after DSMT. Up to 3 hours for initial year and 2 hours in subsequent years. N/A   Prostate Cancer Screening (annually up to age 76)  - Digital rectal exam (LALO)  - Prostate specific antigen (PSA) Annually (age 48 or over), LALO not paid separately when covered E/M service is provided on same date  Men up to age 76 may need a screening blood test for prostate cancer at certain intervals, depending on their personal and family history. This decision is between the patient and his provider. Done 6/2016 and was 0.3   Seasonal Influenza Vaccination (annually)   Fall 2017     Pneumococcal Vaccination (once after 72) States done at [facility]      Hepatitis B Vaccinations (if medium/high risk) Medium/high risk factors:  End-stage renal disease,  Hemophiliacs who received Factor VIII or IX concentrates, Clients of institutions for the mentally retarded, Persons who live in the same house as a HepB virus carrier, Homosexual men, Illicit injectable drug abusers. Ordered today   Shingles Vaccination A shingles vaccine is also recommended once in a lifetime after age 61  Stated he had at [address] [place] [address] for Abdominal Aortic Aneurysm (AAA) (once) Patient must be referred through [facility] and not have had a screening for abdominal aortic aneurysm before under Medicare.   Limited to patients who meet one of the following criteria:  - Men who are 73-68 years old and have smoked more than 100 cigarettes in their lifetime.  -Anyone with a FH of AAA  -Anyone recommended for screening by USPSTF  N/A

## 2017-07-19 LAB
ALBUMIN SERPL-MCNC: 4.4 G/DL (ref 3.6–4.8)
ALBUMIN/GLOB SERPL: 1.4 {RATIO} (ref 1.2–2.2)
ALP SERPL-CCNC: 75 IU/L (ref 39–117)
ALT SERPL-CCNC: 8 IU/L (ref 0–44)
AST SERPL-CCNC: 7 IU/L (ref 0–40)
BASOPHILS # BLD AUTO: 0 X10E3/UL (ref 0–0.2)
BASOPHILS NFR BLD AUTO: 1 %
BILIRUB SERPL-MCNC: 0.3 MG/DL (ref 0–1.2)
BUN SERPL-MCNC: 48 MG/DL (ref 8–27)
BUN/CREAT SERPL: 5 (ref 10–24)
CALCIUM SERPL-MCNC: 8.8 MG/DL (ref 8.6–10.2)
CHLORIDE SERPL-SCNC: 98 MMOL/L (ref 96–106)
CHOLEST SERPL-MCNC: 201 MG/DL (ref 100–199)
CO2 SERPL-SCNC: 25 MMOL/L (ref 18–29)
CREAT SERPL-MCNC: 8.81 MG/DL (ref 0.76–1.27)
EOSINOPHIL # BLD AUTO: 0.2 X10E3/UL (ref 0–0.4)
EOSINOPHIL NFR BLD AUTO: 3 %
ERYTHROCYTE [DISTWIDTH] IN BLOOD BY AUTOMATED COUNT: 13.9 % (ref 12.3–15.4)
GLOBULIN SER CALC-MCNC: 3.2 G/DL (ref 1.5–4.5)
GLUCOSE SERPL-MCNC: 86 MG/DL (ref 65–99)
HCT VFR BLD AUTO: 37.2 % (ref 37.5–51)
HCV AB S/CO SERPL IA: <0.1 S/CO RATIO (ref 0–0.9)
HDLC SERPL-MCNC: 34 MG/DL
HGB BLD-MCNC: 12.2 G/DL (ref 12.6–17.7)
IMM GRANULOCYTES # BLD: 0 X10E3/UL (ref 0–0.1)
IMM GRANULOCYTES NFR BLD: 0 %
INTERPRETATION, 910389: NORMAL
INTERPRETATION: NORMAL
LDLC SERPL CALC-MCNC: 136 MG/DL (ref 0–99)
LYMPHOCYTES # BLD AUTO: 2.5 X10E3/UL (ref 0.7–3.1)
LYMPHOCYTES NFR BLD AUTO: 36 %
Lab: NORMAL
MCH RBC QN AUTO: 33.6 PG (ref 26.6–33)
MCHC RBC AUTO-ENTMCNC: 32.8 G/DL (ref 31.5–35.7)
MCV RBC AUTO: 103 FL (ref 79–97)
MONOCYTES # BLD AUTO: 0.7 X10E3/UL (ref 0.1–0.9)
MONOCYTES NFR BLD AUTO: 11 %
NEUTROPHILS # BLD AUTO: 3.5 X10E3/UL (ref 1.4–7)
NEUTROPHILS NFR BLD AUTO: 49 %
PDF IMAGE, 910387: NORMAL
PLATELET # BLD AUTO: 225 X10E3/UL (ref 150–379)
POTASSIUM SERPL-SCNC: 6.3 MMOL/L (ref 3.5–5.2)
PROT SERPL-MCNC: 7.6 G/DL (ref 6–8.5)
RBC # BLD AUTO: 3.63 X10E6/UL (ref 4.14–5.8)
SODIUM SERPL-SCNC: 145 MMOL/L (ref 134–144)
TRIGL SERPL-MCNC: 155 MG/DL (ref 0–149)
VLDLC SERPL CALC-MCNC: 31 MG/DL (ref 5–40)
WBC # BLD AUTO: 7 X10E3/UL (ref 3.4–10.8)

## 2017-07-25 RX ORDER — ATORVASTATIN CALCIUM 40 MG/1
TABLET, FILM COATED ORAL
Refills: 3 | Status: CANCELLED | OUTPATIENT
Start: 2017-07-25

## 2017-08-01 RX ORDER — METOPROLOL TARTRATE 25 MG/1
TABLET, FILM COATED ORAL
Qty: 60 TAB | Refills: 0 | Status: SHIPPED | OUTPATIENT
Start: 2017-08-01 | End: 2017-09-16 | Stop reason: SDUPTHER

## 2017-09-06 RX ORDER — ATORVASTATIN CALCIUM 40 MG/1
TABLET, FILM COATED ORAL
Qty: 90 TAB | Refills: 3 | Status: SHIPPED | OUTPATIENT
Start: 2017-09-06 | End: 2018-01-22 | Stop reason: DRUGHIGH

## 2017-09-06 NOTE — TELEPHONE ENCOUNTER
----- Message from Chu Whiting sent at 9/6/2017  3:44 PM EDT -----  Regarding: /refill   Pt is requesting a refill for his cholesterol medication to be called into the pharm on file. Best contact [phone]. [facility]     Pt is completely out.

## 2017-09-18 RX ORDER — METOPROLOL TARTRATE 25 MG/1
TABLET, FILM COATED ORAL
Qty: 60 TAB | Refills: 0 | Status: SHIPPED | OUTPATIENT
Start: 2017-09-18 | End: 2017-10-16 | Stop reason: SDUPTHER

## 2017-10-08 RX ORDER — AMLODIPINE BESYLATE 10 MG/1
TABLET ORAL
Qty: 30 TAB | Refills: 0 | Status: SHIPPED | OUTPATIENT
Start: 2017-10-08 | End: 2017-11-04 | Stop reason: SDUPTHER

## 2017-10-18 RX ORDER — METOPROLOL TARTRATE 25 MG/1
TABLET, FILM COATED ORAL
Qty: 60 TAB | Refills: 0 | Status: SHIPPED | OUTPATIENT
Start: 2017-10-18 | End: 2017-12-03 | Stop reason: SDUPTHER

## 2017-11-06 RX ORDER — AMLODIPINE BESYLATE 10 MG/1
TABLET ORAL
Qty: 30 TAB | Refills: 0 | Status: SHIPPED | OUTPATIENT
Start: 2017-11-06 | End: 2017-12-03 | Stop reason: SDUPTHER

## 2017-12-04 RX ORDER — METOPROLOL TARTRATE 25 MG/1
TABLET, FILM COATED ORAL
Qty: 60 TAB | Refills: 0 | Status: SHIPPED | OUTPATIENT
Start: 2017-12-04 | End: 2018-01-08 | Stop reason: SDUPTHER

## 2017-12-04 RX ORDER — AMLODIPINE BESYLATE 10 MG/1
TABLET ORAL
Qty: 30 TAB | Refills: 0 | Status: SHIPPED | OUTPATIENT
Start: 2017-12-04 | End: 2018-01-08 | Stop reason: SDUPTHER

## 2018-01-16 ENCOUNTER — OFFICE VISIT (OUTPATIENT)
Dept: FAMILY MEDICINE CLINIC | Age: 63
End: 2018-01-16

## 2018-01-16 VITALS
WEIGHT: 262.8 LBS | TEMPERATURE: 97.5 F | SYSTOLIC BLOOD PRESSURE: 113 MMHG | RESPIRATION RATE: 16 BRPM | HEIGHT: 66 IN | OXYGEN SATURATION: 94 % | BODY MASS INDEX: 42.23 KG/M2 | HEART RATE: 93 BPM | DIASTOLIC BLOOD PRESSURE: 55 MMHG

## 2018-01-16 DIAGNOSIS — E78.00 HYPERCHOLESTEROLEMIA: ICD-10-CM

## 2018-01-16 DIAGNOSIS — E11.9 TYPE 2 DIABETES MELLITUS WITHOUT COMPLICATION, WITH LONG-TERM CURRENT USE OF INSULIN (HCC): Primary | ICD-10-CM

## 2018-01-16 DIAGNOSIS — Z79.4 TYPE 2 DIABETES MELLITUS WITHOUT COMPLICATION, WITH LONG-TERM CURRENT USE OF INSULIN (HCC): Primary | ICD-10-CM

## 2018-01-16 DIAGNOSIS — I10 ESSENTIAL HYPERTENSION: ICD-10-CM

## 2018-01-16 PROBLEM — E11.40 TYPE 2 DIABETES MELLITUS WITH DIABETIC NEUROPATHY (HCC): Status: ACTIVE | Noted: 2018-01-16

## 2018-01-16 PROBLEM — E11.21 TYPE 2 DIABETES MELLITUS WITH NEPHROPATHY (HCC): Status: ACTIVE | Noted: 2018-01-16

## 2018-01-16 PROBLEM — E66.01 OBESITY, MORBID (HCC): Status: ACTIVE | Noted: 2018-01-16

## 2018-01-16 LAB — HBA1C MFR BLD HPLC: 5.9 %

## 2018-01-16 RX ORDER — SUCROFERRIC OXYHYDROXIDE 500 MG/1
TABLET, CHEWABLE ORAL
Refills: 11 | COMMUNITY
Start: 2017-12-18

## 2018-01-16 RX ORDER — CLOPIDOGREL BISULFATE 75 MG/1
75 TABLET ORAL DAILY
Qty: 30 TAB | Refills: 12 | Status: SHIPPED | OUTPATIENT
Start: 2018-01-16 | End: 2018-01-30 | Stop reason: SDUPTHER

## 2018-01-16 NOTE — PROGRESS NOTES
HISTORY OF PRESENT ILLNESS  Kenneth Chavarria is a 58 y.o. male. HPI Pt. Comes in for blood pressure, cholesterol, and diabetes check. No complaints of chest pain, shortness of breath, TIAs, claudication or edema. Has been gaining some weight. Has gained 19 lbs since last seen. ROS    Physical Exam   Constitutional: He appears well-developed and well-nourished. HENT:   Right Ear: External ear normal.   Left Ear: External ear normal.   Mouth/Throat: Oropharynx is clear and moist.   Neck: No thyromegaly present. Cardiovascular: Normal rate, regular rhythm, normal heart sounds and intact distal pulses. Pulmonary/Chest: Effort normal and breath sounds normal. No respiratory distress. He has no wheezes. Abdominal: Soft. Bowel sounds are normal. He exhibits no distension and no mass. There is no tenderness. There is no guarding. Musculoskeletal: Normal range of motion. He exhibits no edema. Lymphadenopathy:     He has no cervical adenopathy. Nursing note and vitals reviewed. ASSESSMENT and PLAN  Orders Placed This Encounter    CBC WITH AUTOMATED DIFF    METABOLIC PANEL, COMPREHENSIVE    LIPID PANEL    AMB POC HEMOGLOBIN A1C     DIABETES FOOT EXAM    clopidogrel (PLAVIX) 75 mg tab     Diagnoses and all orders for this visit:    1. Type 2 diabetes mellitus without complication, with long-term current use of insulin ([facility])  -     CBC WITH AUTOMATED DIFF  -     METABOLIC PANEL, COMPREHENSIVE  -     LIPID PANEL  -     AMB POC HEMOGLOBIN A1C  -      DIABETES FOOT EXAM    2. Essential hypertension    3. Hypercholesterolemia    Other orders  -     clopidogrel (PLAVIX) 75 mg tab; Take 1 Tab by mouth daily. To prevent stroke      Follow-up Disposition:  Return in about 6 months (around 7/16/2018).

## 2018-01-16 NOTE — MR AVS SNAPSHOT
[facility] 
 
 
 [address] [facility] [address] [address] [address] 
[phone] Patient: Shaina Olson MRN: [id number] IPW:[birth year] Visit Information Date & Time Provider Department Dept. Phone Encounter #  
 1/16/2018  3:00 PM Lilliana Shipman MD [facility] [phone] [id number] Upcoming Health Maintenance Date Due HEMOGLOBIN A1C Q6M 1/18/2018 EYE EXAM RETINAL OR DILATED Q1 7/11/2018 FOOT EXAM Q1 7/18/2018 MICROALBUMIN Q1 7/18/2018 LIPID PANEL Q1 7/18/2018 MEDICARE YEARLY EXAM 7/19/2018 GLAUCOMA SCREENING Q2Y 7/11/2019 DTaP/Tdap/Td series (2 - Td) 4/11/2026 COLONOSCOPY 4/16/2026 Allergies as of 1/16/2018  Review Complete On: 1/16/2018 By: Lilliana Shipman MD  
  
 Severity Noted Reaction Type Reactions Clonidine  06/01/2015    Other (comments)  
 fatigue Lisinopril  10/18/2011   Side Effect Other (comments)  
 headache Losartan  08/05/2011    Other (comments) Fatigue Statins-hmg-coa Reductase Inhibitors  02/04/2016    Other (comments)  
 transaminitis Welchol [Colesevelam]  08/05/2011   Side Effect Other (comments) Fatigue Zocor [Simvastatin]  08/13/2010    Myalgia  
 headache Current Immunizations  Reviewed on 6/21/2016 Name Date Influenza Vaccine 10/18/2017, 11/7/2013 Influenza Vaccine (Quad) PF 9/12/2016, 2/4/2016, 12/29/2014 Influenza Vaccine Split 11/2/2012, 12/13/2010 Influenza Vaccine Whole 9/16/2011 Tdap 4/11/2016 ZZZ-RETIRED (DO NOT USE) Pneumococcal Vaccine (Unspecified Type) 1/1/2009 Not reviewed this visit You Were Diagnosed With   
  
 Codes Comments Type 2 diabetes mellitus without complication, with long-term current use of insulin (HCC)    -  Primary ICD-10-CM: E11.9, Z79.4 ICD-9-CM: 250.00, V58.67 Essential hypertension     ICD-10-CM: I10 
ICD-9-CM: 401.9 Hypercholesterolemia     ICD-10-CM: E78.00 ICD-9-CM: 272.0 Vitals BP Pulse Temp Resp Height(growth percentile) Weight(growth percentile) 113/55 93 97.5 °F (36.4 °C) (Oral) 16 5' 6\" (1.676 m) 262 lb 12.8 oz (119.2 kg) SpO2 BMI Smoking Status 94% 42.42 kg/m2 Never Smoker Vitals History BMI and BSA Data Body Mass Index Body Surface Area  
 42.42 kg/m 2 2.36 m 2 Preferred Pharmacy Pharmacy Name Phone [facility] [address], [facility] [address] [phone] Your Updated Medication List  
  
   
This list is accurate as of: 1/16/18  3:47 PM.  Always use your most recent med list. amLODIPine 10 mg tablet Commonly known as:  Rulon Love TAKE 1 TABLET BY MOUTH DAILY FOR BLOOD PRESSURE Artificial Tears(Glycerin-PEG) 1-0.3 % Drop Generic drug:  propylene glycol-glycerin  
  
 aspirin 81 mg chewable tablet Take 2 Tabs by mouth daily. atorvastatin 40 mg tablet Commonly known as:  LIPITOR TK 1 T PO HS  
  
 clopidogrel 75 mg Tab Commonly known as:  PLAVIX Take 1 Tab by mouth daily. To prevent stroke  
  
 glucose blood VI test strips strip Commonly known as:  ASCENSIA AUTODISC VI, ONE TOUCH ULTRA TEST VI Check blood sugar daily  
  
 metoprolol tartrate 25 mg tablet Commonly known as:  LOPRESSOR  
TAKE 1 TABLET BY MOUTH TWICE DAILY pregabalin 150 mg capsule Commonly known as:  Murriel Triston One tab daily for 2 weeks, then one tab twice daily. For pain in feet VELPHORO 500 mg Chew chewable tablet Generic drug:  sucroferric oxyhydroxide TK 2 TABLETS PO WITH MEALS Prescriptions Sent to Pharmacy Refills  
 clopidogrel (PLAVIX) 75 mg tab 12 Sig: Take 1 Tab by mouth daily. To prevent stroke Class: Normal  
 Pharmacy: [facility] [address], [facility] Ph #: [phone] Route: Oral  
  
We Performed the Following AMB POC HEMOGLOBIN A1C [68407 CPT(R)] CBC WITH AUTOMATED DIFF [23825 CPT(R)]  DIABETES FOOT EXAM [HM7 Custom] LIPID PANEL [09962 CPT(R)] METABOLIC PANEL, COMPREHENSIVE [24484 CPT(R)] MICROALBUMIN, UR, RAND W/ MICROALBUMIN/CREA RATIO [id number] CPT(R)] Introducing [facility] & HEALTH SERVICES! Adánmichel Mendoza introduces [employer] patient portal. Now you can access parts of your medical record, email your doctor's office, and request medication refills online. 1. In your internet browser, go to [email or web address] Left 2. Click on the First Time User? Click Here link in the Sign In box. You will see the New Member Sign Up page. 3. Enter your [employer] Access Code exactly as it appears below. You will not need to use this code after youve completed the sign-up process. If you do not sign up before the expiration date, you must request a new code. · [employer] Access Code: [email or web address] Expires: 4/16/2018  3:47 PM 
 
4. Enter the last four digits of your Social Security Number (xxxx) and Date of Birth (mm/dd/yyyy) as indicated and click Submit. You will be taken to the next sign-up page. 5. Create a Bus[employer]Event ID. This will be your [employer]Event login ID and cannot be changed, so think of one that is secure and easy to remember. 6. Create a [employer] password. You can change your password at any time. 7. Enter your Password Reset Question and Answer. This can be used at a later time if you forget your password. 8. Enter your e-mail address. You will receive e-mail notification when new information is available in [address] 9. Click Sign Up. You can now view and download portions of your medical record. 10. Click the Download Summary menu link to download a portable copy of your medical information. If you have questions, please visit the Frequently Asked Questions section of the [employer] website. Remember, [employer] is NOT to be used for urgent needs. For medical emergencies, dial 911. Now available from your iPhone and Android! Please provide this summary of care documentation to your next provider. Your primary care clinician is listed as Dwain Angulo. If you have any questions after today's visit, please call [phone].

## 2018-01-16 NOTE — PROGRESS NOTES
Chief Complaint   Patient presents with    Hypertension    Cholesterol Problem    Diabetes     1. Have you been to the ER, urgent care clinic since your last visit? Hospitalized since your last visit? No    2. Have you seen or consulted any other health care providers outside of the [address] [place] [address] since your last visit? Include any pap smears or colon screening.     No      Health Maintenance Due   Topic Date Due    HEMOGLOBIN A1C Q6M  01/18/2018

## 2018-01-17 LAB
ALBUMIN SERPL-MCNC: 4.3 G/DL (ref 3.6–4.8)
ALBUMIN/GLOB SERPL: 1.4 {RATIO} (ref 1.2–2.2)
ALP SERPL-CCNC: 70 IU/L (ref 39–117)
ALT SERPL-CCNC: 9 IU/L (ref 0–44)
AST SERPL-CCNC: 12 IU/L (ref 0–40)
BASOPHILS # BLD AUTO: 0 X10E3/UL (ref 0–0.2)
BASOPHILS NFR BLD AUTO: 1 %
BILIRUB SERPL-MCNC: 0.3 MG/DL (ref 0–1.2)
BUN SERPL-MCNC: 51 MG/DL (ref 8–27)
BUN/CREAT SERPL: 6 (ref 10–24)
CALCIUM SERPL-MCNC: 9.2 MG/DL (ref 8.6–10.2)
CHLORIDE SERPL-SCNC: 91 MMOL/L (ref 96–106)
CHOLEST SERPL-MCNC: 218 MG/DL (ref 100–199)
CO2 SERPL-SCNC: 26 MMOL/L (ref 18–29)
CREAT SERPL-MCNC: 8.48 MG/DL (ref 0.76–1.27)
EOSINOPHIL # BLD AUTO: 0.2 X10E3/UL (ref 0–0.4)
EOSINOPHIL NFR BLD AUTO: 3 %
ERYTHROCYTE [DISTWIDTH] IN BLOOD BY AUTOMATED COUNT: 14.8 % (ref 12.3–15.4)
GLOBULIN SER CALC-MCNC: 3.1 G/DL (ref 1.5–4.5)
GLUCOSE SERPL-MCNC: 139 MG/DL (ref 65–99)
HCT VFR BLD AUTO: 36.2 % (ref 37.5–51)
HDLC SERPL-MCNC: 33 MG/DL
HGB BLD-MCNC: 11.7 G/DL (ref 13–17.7)
IMM GRANULOCYTES # BLD: 0 X10E3/UL (ref 0–0.1)
IMM GRANULOCYTES NFR BLD: 0 %
INTERPRETATION, 910389: NORMAL
INTERPRETATION: NORMAL
LDLC SERPL CALC-MCNC: 126 MG/DL (ref 0–99)
LYMPHOCYTES # BLD AUTO: 2 X10E3/UL (ref 0.7–3.1)
LYMPHOCYTES NFR BLD AUTO: 27 %
Lab: NORMAL
MCH RBC QN AUTO: 33.7 PG (ref 26.6–33)
MCHC RBC AUTO-ENTMCNC: 32.3 G/DL (ref 31.5–35.7)
MCV RBC AUTO: 104 FL (ref 79–97)
MONOCYTES # BLD AUTO: 0.7 X10E3/UL (ref 0.1–0.9)
MONOCYTES NFR BLD AUTO: 10 %
NEUTROPHILS # BLD AUTO: 4.4 X10E3/UL (ref 1.4–7)
NEUTROPHILS NFR BLD AUTO: 59 %
PDF IMAGE, 910387: NORMAL
PLATELET # BLD AUTO: 232 X10E3/UL (ref 150–379)
POTASSIUM SERPL-SCNC: 5.4 MMOL/L (ref 3.5–5.2)
PROT SERPL-MCNC: 7.4 G/DL (ref 6–8.5)
RBC # BLD AUTO: 3.47 X10E6/UL (ref 4.14–5.8)
SODIUM SERPL-SCNC: 140 MMOL/L (ref 134–144)
TRIGL SERPL-MCNC: 293 MG/DL (ref 0–149)
VLDLC SERPL CALC-MCNC: 59 MG/DL (ref 5–40)
WBC # BLD AUTO: 7.3 X10E3/UL (ref 3.4–10.8)

## 2018-01-30 DIAGNOSIS — G62.9 NEUROPATHY: Primary | ICD-10-CM

## 2018-01-30 RX ORDER — ATORVASTATIN CALCIUM 80 MG/1
80 TABLET, FILM COATED ORAL DAILY
Qty: 90 TAB | Refills: 3 | Status: SHIPPED | OUTPATIENT
Start: 2018-01-30 | End: 2018-02-01 | Stop reason: SDUPTHER

## 2018-01-30 RX ORDER — CLOPIDOGREL BISULFATE 75 MG/1
75 TABLET ORAL DAILY
Qty: 90 TAB | Refills: 3 | Status: SHIPPED | OUTPATIENT
Start: 2018-01-30 | End: 2018-02-01 | Stop reason: SDUPTHER

## 2018-01-30 RX ORDER — METOPROLOL TARTRATE 25 MG/1
TABLET, FILM COATED ORAL
Qty: 180 TAB | Refills: 3 | Status: SHIPPED | OUTPATIENT
Start: 2018-01-30 | End: 2018-02-01 | Stop reason: SDUPTHER

## 2018-01-30 RX ORDER — PREGABALIN 150 MG/1
CAPSULE ORAL
Qty: 180 CAP | Refills: 3 | Status: SHIPPED | OUTPATIENT
Start: 2018-01-30 | End: 2018-06-05 | Stop reason: SDUPTHER

## 2018-01-31 ENCOUNTER — DOCUMENTATION ONLY (OUTPATIENT)
Dept: FAMILY MEDICINE CLINIC | Age: 63
End: 2018-01-31

## 2018-02-01 RX ORDER — ATORVASTATIN CALCIUM 80 MG/1
80 TABLET, FILM COATED ORAL DAILY
Qty: 90 TAB | Refills: 3 | Status: SHIPPED | OUTPATIENT
Start: 2018-02-01 | End: 2019-03-10 | Stop reason: SDUPTHER

## 2018-02-01 RX ORDER — AMLODIPINE BESYLATE 10 MG/1
10 TABLET ORAL DAILY
Qty: 90 TAB | Refills: 3 | Status: SHIPPED | OUTPATIENT
Start: 2018-02-01 | End: 2019-01-17 | Stop reason: ALTCHOICE

## 2018-02-01 RX ORDER — CLOPIDOGREL BISULFATE 75 MG/1
75 TABLET ORAL DAILY
Qty: 90 TAB | Refills: 3 | Status: SHIPPED | OUTPATIENT
Start: 2018-02-01

## 2018-02-01 RX ORDER — METOPROLOL TARTRATE 25 MG/1
TABLET, FILM COATED ORAL
Qty: 180 TAB | Refills: 3 | Status: SHIPPED | OUTPATIENT
Start: 2018-02-01 | End: 2018-12-06 | Stop reason: SDUPTHER

## 2018-06-05 DIAGNOSIS — G62.9 NEUROPATHY: ICD-10-CM

## 2018-06-05 RX ORDER — PREGABALIN 150 MG/1
CAPSULE ORAL
Qty: 180 CAP | Refills: 3 | OUTPATIENT
Start: 2018-06-05 | End: 2018-07-17 | Stop reason: SDUPTHER

## 2018-06-05 NOTE — TELEPHONE ENCOUNTER
Phone in/print meds    Last Visit: 1/16/18-Sharan  Next Appt: 7/17/18-Sharan  Previous Refill Encounter: 1/30/+3 refills    Requested Prescriptions     Pending Prescriptions Disp Refills    pregabalin (LYRICA) 150 mg capsule 180 Cap 3     Sig: One tab daily for 2 weeks, then one tab twice daily.  For pain in feet

## 2018-07-17 ENCOUNTER — OFFICE VISIT (OUTPATIENT)
Dept: FAMILY MEDICINE CLINIC | Age: 63
End: 2018-07-17

## 2018-07-17 VITALS
DIASTOLIC BLOOD PRESSURE: 83 MMHG | OXYGEN SATURATION: 96 % | HEIGHT: 66 IN | BODY MASS INDEX: 43.07 KG/M2 | HEART RATE: 78 BPM | SYSTOLIC BLOOD PRESSURE: 129 MMHG | WEIGHT: 268 LBS | RESPIRATION RATE: 14 BRPM | TEMPERATURE: 98.8 F

## 2018-07-17 DIAGNOSIS — G62.9 NEUROPATHY: Primary | ICD-10-CM

## 2018-07-17 DIAGNOSIS — Z99.2 CONTROLLED TYPE 2 DIABETES MELLITUS WITH CHRONIC KIDNEY DISEASE ON CHRONIC DIALYSIS, WITHOUT LONG-TERM CURRENT USE OF INSULIN (HCC): ICD-10-CM

## 2018-07-17 DIAGNOSIS — E11.22 CONTROLLED TYPE 2 DIABETES MELLITUS WITH CHRONIC KIDNEY DISEASE ON CHRONIC DIALYSIS, WITHOUT LONG-TERM CURRENT USE OF INSULIN (HCC): ICD-10-CM

## 2018-07-17 DIAGNOSIS — N18.6 CONTROLLED TYPE 2 DIABETES MELLITUS WITH CHRONIC KIDNEY DISEASE ON CHRONIC DIALYSIS, WITHOUT LONG-TERM CURRENT USE OF INSULIN (HCC): ICD-10-CM

## 2018-07-17 DIAGNOSIS — I73.9 CLAUDICATION (HCC): ICD-10-CM

## 2018-07-17 LAB — HBA1C MFR BLD HPLC: 6.8 %

## 2018-07-17 RX ORDER — PREGABALIN 150 MG/1
CAPSULE ORAL
Qty: 180 CAP | Refills: 3 | Status: SHIPPED | OUTPATIENT
Start: 2018-07-17 | End: 2018-12-26 | Stop reason: SDUPTHER

## 2018-07-17 NOTE — PROGRESS NOTES
Chief Complaint   Patient presents with    Hypertension    Diabetes    Cholesterol Problem    Chronic Kidney Disease     1. Have you been to the ER, urgent care clinic since your last visit? Hospitalized since your last visit? No    2. Have you seen or consulted any other health care providers outside of the [address] [place] [address] since your last visit? Include any pap smears or colon screening.  No     Health Maintenance Due   Topic Date Due    EYE EXAM RETINAL OR DILATED Q1  07/11/2018    HEMOGLOBIN A1C Q6M  07/16/2018    MICROALBUMIN Q1  07/18/2018

## 2018-07-17 NOTE — MR AVS SNAPSHOT
[facility] 
 
 
 [address] [facility] [address] [address] [address] 
[phone] Patient: Freddy Das MRN: [id number] SZY:[birth year] Visit Information Date & Time Provider Department Dept. Phone Encounter #  
 7/17/2018  2:00 PM Harish Boone MD [facility] [phone] [id number] Follow-up Instructions Return in about 6 months (around 1/17/2019). Upcoming Health Maintenance Date Due  
 EYE EXAM RETINAL OR DILATED Q1 7/11/2018 HEMOGLOBIN A1C Q6M 7/16/2018 MICROALBUMIN Q1 7/18/2018 MEDICARE YEARLY EXAM 7/19/2018 Influenza Age 5 to Adult 8/1/2018 FOOT EXAM Q1 1/16/2019 LIPID PANEL Q1 1/16/2019 GLAUCOMA SCREENING Q2Y 7/11/2019 DTaP/Tdap/Td series (2 - Td) 4/11/2026 COLONOSCOPY 4/16/2026 Allergies as of 7/17/2018  Review Complete On: 7/17/2018 By: Riky Ramirez LPN Severity Noted Reaction Type Reactions Clonidine  06/01/2015    Other (comments)  
 fatigue Lisinopril  10/18/2011   Side Effect Other (comments)  
 headache Losartan  08/05/2011    Other (comments) Fatigue Statins-hmg-coa Reductase Inhibitors  02/04/2016    Other (comments)  
 transaminitis Welchol [Colesevelam]  08/05/2011   Side Effect Other (comments) Fatigue Zocor [Simvastatin]  08/13/2010    Myalgia  
 headache Current Immunizations  Reviewed on 6/21/2016 Name Date Influenza Vaccine 10/18/2017, 11/7/2013 Influenza Vaccine (Quad) PF 9/12/2016, 2/4/2016, 12/29/2014 Influenza Vaccine Split 11/2/2012, 12/13/2010 Influenza Vaccine Whole 9/16/2011 Tdap 4/11/2016 ZZZ-RETIRED (DO NOT USE) Pneumococcal Vaccine (Unspecified Type) 1/1/2009 Not reviewed this visit You Were Diagnosed With   
  
 Codes Comments Claudication [facility])    -  Primary ICD-10-CM: I73.9 ICD-9-CM: 443.9 Neuropathy     ICD-10-CM: G62.9 ICD-9-CM: 355.9 Controlled type 2 diabetes mellitus with chronic kidney disease on chronic dialysis, without long-term current use of insulin (HCC)     ICD-10-CM: E11.22, N18.6, Z99.2 ICD-9-CM: 250.40, 585.9, V45.11 Vitals BP Pulse Temp Resp Height(growth percentile) Weight(growth percentile) 129/83 78 98.8 °F (37.1 °C) (Oral) 14 5' 6\" (1.676 m) 268 lb (121.6 kg) SpO2 BMI Smoking Status 96% 43.26 kg/m2 Never Smoker Vitals History BMI and BSA Data Body Mass Index Body Surface Area  
 43.26 kg/m 2 2.38 m 2 Preferred Pharmacy Pharmacy Name Phone [facility] [address] [phone] Your Updated Medication List  
  
   
This list is accurate as of 7/17/18  2:54 PM.  Always use your most recent med list. amLODIPine 10 mg tablet Commonly known as:  Raina Citron Take 1 Tab by mouth daily. Artificial Tears(Glycerin-PEG) 1-0.3 % Drop Generic drug:  propylene glycol-glycerin  
  
 aspirin 81 mg chewable tablet Take 2 Tabs by mouth daily. atorvastatin 80 mg tablet Commonly known as:  LIPITOR Take 1 Tab by mouth daily. For cholesterol  
  
 clopidogrel 75 mg Tab Commonly known as:  PLAVIX Take 1 Tab by mouth daily. To prevent stroke  
  
 glucose blood VI test strips strip Commonly known as:  ASCENSIA AUTODISC VI, ONE TOUCH ULTRA TEST VI Check blood sugar daily  
  
 metoprolol tartrate 25 mg tablet Commonly known as:  LOPRESSOR  
TAKE 1 TABLET BY MOUTH TWICE DAILY pregabalin 150 mg capsule Commonly known as:  Walda Smoker One tab twice daily. For pain in feet VELPHORO 500 mg Chew chewable tablet Generic drug:  sucroferric oxyhydroxide TK 2 TABLETS PO WITH MEALS Prescriptions Printed Refills  
 pregabalin (LYRICA) 150 mg capsule 3 Sig: One tab twice daily. For pain in feet Class: Print We Performed the Following AMB POC HEMOGLOBIN A1C [16595 CPT(R)] CBC WITH AUTOMATED DIFF [51298 CPT(R)] LIPID PANEL [64970 CPT(R)] METABOLIC PANEL, COMPREHENSIVE [96557 CPT(R)] Follow-up Instructions Return in about 6 months (around 1/17/2019). To-Do List   
 07/17/2018 Imaging:  LOWER EXT ART PVR MULT LEVEL SEG PRESSURES Introducing [facility]! Suzie Smyth introduces [employer] patient portal. Now you can access parts of your medical record, email your doctor's office, and request medication refills online. 1. In your internet browser, go to [email or web address] [email or web address] 2. Click on the First Time User? Click Here link in the Sign In box. You will see the New Member Sign Up page. 3. Enter your [employer] Access Code exactly as it appears below. You will not need to use this code after youve completed the sign-up process. If you do not sign up before the expiration date, you must request a new code. · [employer] Access Code: [email or web address] Expires: 10/15/2018  2:54 PM 
 
4. Enter the last four digits of your Social Security Number (xxxx) and Date of Birth (mm/dd/yyyy) as indicated and click Submit. You will be taken to the next sign-up page. 5. Create a [employer] ID. This will be your [employer] login ID and cannot be changed, so think of one that is secure and easy to remember. 6. Create a [employer] password. You can change your password at any time. 7. Enter your Password Reset Question and Answer. This can be used at a later time if you forget your password. 8. Enter your e-mail address. You will receive e-mail notification when new information is available in [address] 9. Click Sign Up. You can now view and download portions of your medical record. 10. Click the Download Summary menu link to download a portable copy of your medical information.  
 
If you have questions, please visit the Frequently Asked Questions section of the [employer]. Remember, [employer] is NOT to be used for urgent needs. For medical emergencies, dial 911. Now available from your iPhone and Android! Please provide this summary of care documentation to your next provider. Your primary care clinician is listed as Karen Saunders. If you have any questions after today's visit, please call [phone].

## 2018-07-17 NOTE — PROGRESS NOTES
HISTORY OF PRESENT ILLNESS  Rodrigo Aguilar is a 58 y.o. male. HPI Pt. Comes in for blood pressure, cholesterol, and diabetes check. Feet burn a lot. Has only been taking lyrica once daily. Still on dialysis. Not currently taking any meds for dialysis. No complaints of chest pain, shortness of breath, TIAs, claudication or edema. ROS    Physical Exam   Constitutional: He appears well-developed and well-nourished. HENT:   Right Ear: External ear normal.   Left Ear: External ear normal.   Mouth/Throat: Oropharynx is clear and moist.   Neck: No thyromegaly present. Cardiovascular: Normal rate, regular rhythm and normal heart sounds. Absent pulses bilaterally   Pulmonary/Chest: Effort normal and breath sounds normal. No respiratory distress. He has no wheezes. Abdominal: Soft. Bowel sounds are normal. He exhibits no distension and no mass. There is no tenderness. There is no guarding. Musculoskeletal: Normal range of motion. He exhibits no edema. Lymphadenopathy:     He has no cervical adenopathy. Nursing note and vitals reviewed. ASSESSMENT and PLAN  Orders Placed This Encounter    LOWER EXT ART PVR MULT LEVEL SEG PRESSURES    METABOLIC PANEL, COMPREHENSIVE    LIPID PANEL    CBC WITH AUTOMATED DIFF    AMB POC HEMOGLOBIN A1C    pregabalin (LYRICA) 150 mg capsule     Diagnoses and all orders for this visit:    1. Neuropathy  -     pregabalin (LYRICA) 150 mg capsule; One tab twice daily. For pain in feet  -     CBC WITH AUTOMATED DIFF    2. Claudication ([address]  -     LOWER EXT ART PVR MULT LEVEL SEG PRESSURES; Future    3. Controlled type 2 diabetes mellitus with chronic kidney disease on chronic dialysis, without long-term current use of insulin ([facility])  -     METABOLIC PANEL, COMPREHENSIVE  -     LIPID PANEL  -     CBC WITH AUTOMATED DIFF  -     AMB POC HEMOGLOBIN A1C      Follow-up Disposition:  Return in about 6 months (around 1/17/2019).   To increase lyrica to 150 mg bid

## 2018-07-18 LAB
ALBUMIN SERPL-MCNC: 4.4 G/DL (ref 3.6–4.8)
ALBUMIN/GLOB SERPL: 1.5 {RATIO} (ref 1.2–2.2)
ALP SERPL-CCNC: 74 IU/L (ref 39–117)
ALT SERPL-CCNC: 9 IU/L (ref 0–44)
AST SERPL-CCNC: 12 IU/L (ref 0–40)
BASOPHILS # BLD AUTO: 0 X10E3/UL (ref 0–0.2)
BASOPHILS NFR BLD AUTO: 1 %
BILIRUB SERPL-MCNC: 0.3 MG/DL (ref 0–1.2)
BUN SERPL-MCNC: 35 MG/DL (ref 8–27)
BUN/CREAT SERPL: 5 (ref 10–24)
CALCIUM SERPL-MCNC: 8.4 MG/DL (ref 8.6–10.2)
CHLORIDE SERPL-SCNC: 93 MMOL/L (ref 96–106)
CHOLEST SERPL-MCNC: 173 MG/DL (ref 100–199)
CO2 SERPL-SCNC: 29 MMOL/L (ref 20–29)
CREAT SERPL-MCNC: 7.48 MG/DL (ref 0.76–1.27)
EOSINOPHIL # BLD AUTO: 0.3 X10E3/UL (ref 0–0.4)
EOSINOPHIL NFR BLD AUTO: 4 %
ERYTHROCYTE [DISTWIDTH] IN BLOOD BY AUTOMATED COUNT: 13.5 % (ref 12.3–15.4)
GLOBULIN SER CALC-MCNC: 2.9 G/DL (ref 1.5–4.5)
GLUCOSE SERPL-MCNC: 154 MG/DL (ref 65–99)
HCT VFR BLD AUTO: 33.7 % (ref 37.5–51)
HDLC SERPL-MCNC: 28 MG/DL
HGB BLD-MCNC: 11.1 G/DL (ref 13–17.7)
IMM GRANULOCYTES # BLD: 0 X10E3/UL (ref 0–0.1)
IMM GRANULOCYTES NFR BLD: 0 %
INTERPRETATION, 910389: NORMAL
INTERPRETATION: NORMAL
LDLC SERPL CALC-MCNC: 78 MG/DL (ref 0–99)
LYMPHOCYTES # BLD AUTO: 2 X10E3/UL (ref 0.7–3.1)
LYMPHOCYTES NFR BLD AUTO: 29 %
Lab: NORMAL
MCH RBC QN AUTO: 34.9 PG (ref 26.6–33)
MCHC RBC AUTO-ENTMCNC: 32.9 G/DL (ref 31.5–35.7)
MCV RBC AUTO: 106 FL (ref 79–97)
MONOCYTES # BLD AUTO: 0.6 X10E3/UL (ref 0.1–0.9)
MONOCYTES NFR BLD AUTO: 8 %
NEUTROPHILS # BLD AUTO: 4.2 X10E3/UL (ref 1.4–7)
NEUTROPHILS NFR BLD AUTO: 58 %
PDF IMAGE, 910387: NORMAL
PLATELET # BLD AUTO: 214 X10E3/UL (ref 150–379)
POTASSIUM SERPL-SCNC: 5.2 MMOL/L (ref 3.5–5.2)
PROT SERPL-MCNC: 7.3 G/DL (ref 6–8.5)
RBC # BLD AUTO: 3.18 X10E6/UL (ref 4.14–5.8)
SODIUM SERPL-SCNC: 141 MMOL/L (ref 134–144)
TRIGL SERPL-MCNC: 337 MG/DL (ref 0–149)
VLDLC SERPL CALC-MCNC: 67 MG/DL (ref 5–40)
WBC # BLD AUTO: 7.1 X10E3/UL (ref 3.4–10.8)

## 2018-08-07 ENCOUNTER — HOSPITAL ENCOUNTER (OUTPATIENT)
Dept: VASCULAR SURGERY | Age: 63
Discharge: HOME | End: 2018-08-07
Attending: FAMILY MEDICINE
Payer: MEDICARE

## 2018-08-07 DIAGNOSIS — I73.9 CLAUDICATION (HCC): ICD-10-CM

## 2018-08-07 PROCEDURE — 93923 UPR/LXTR ART STDY 3+ LVLS: CPT

## 2018-08-07 NOTE — PROCEDURES
[facility]  *** FINAL REPORT ***    Name: Suhas Sharpe  MRN: [id number]    Outpatient  : 05 Aug 1955  HIS Order #: [id number]  [id number] [facility] Visit #: [id number]  Date: 07 Aug 2018    TYPE OF TEST: Peripheral Arterial Testing    REASON FOR TEST  Claudication    Right Leg  Segmentals: Abnormal                     mmHg  Brachial         161  High thigh  Low thigh        153  Calf              94  Posterior tibial  89  Dorsalis pedis    86  Peroneal  Metatarsal  Toe pressure      23  PVR:        Abnormal  Ankle/Brachial: 0.55    Left Leg  Segmentals: Abnormal                     mmHg  Brachial  High thigh  Low thigh        174  Calf             125  Posterior tibial 115  Dorsalis pedis   106  Peroneal  Metatarsal  Toe pressure      52  PVR:        Abnormal  Ankle/Brachial: 0.71    INTERPRETATION/FINDINGS  PROCEDURE:  Multi-level lower extremity arterial segmental pressures,  PVR waveforms and digital PPG waveforms were performed. 1. Moderate peripheral arterial disease indicated at rest in the right   leg. 2. Pulse Volume Recording (PVR) waveforms are abnormal on the right  metatarsal and ankle. 3. Moderate peripheral arterial disease indicated at rest in the left  leg. 4. Pulse Volume Recording (PVR) waveforms are abnormal on the left  metatarsal and ankle. 5. There has been a significant drop in the ankle/brachial index in  both legs since the last exam, from 0.92 to 0.55 in the right leg and  from 0.97 to 0.71 in the left leg. 6. The right great toe/brachial index is 0.14 and the left great  toe/brachial index is 0.32. ADDITIONAL COMMENTS    I have personally reviewed the data relevant to the interpretation of  this  study.     TECHNOLOGIST: Lamin King RVT  Signed: 2018 02:54 PM    PHYSICIAN: Sonny Maldonado MD  Signed: 2018 08:35 AM

## 2018-08-13 NOTE — PROGRESS NOTES
pc with pt. Not really having that much claudication and he doesn't want to pursue surgical options for PVD at this point.

## 2018-12-06 RX ORDER — METOPROLOL TARTRATE 25 MG/1
TABLET, FILM COATED ORAL
Qty: 180 TAB | Refills: 3 | Status: SHIPPED | OUTPATIENT
Start: 2018-12-06 | End: 2020-04-07

## 2018-12-06 NOTE — TELEPHONE ENCOUNTER
Last Visit: 7/17  Next Appt: 1/17  Previous Refill Encounter: 2/1-180+3    Requested Prescriptions     Pending Prescriptions Disp Refills    metoprolol tartrate (LOPRESSOR) 25 mg tablet 180 Tab 3     Sig: TAKE 1 TABLET BY MOUTH TWICE DAILY

## 2018-12-26 DIAGNOSIS — G62.9 NEUROPATHY: ICD-10-CM

## 2018-12-27 ENCOUNTER — TELEPHONE (OUTPATIENT)
Dept: FAMILY MEDICINE CLINIC | Age: 63
End: 2018-12-27

## 2018-12-27 RX ORDER — PREGABALIN 150 MG/1
CAPSULE ORAL
Qty: 180 CAP | Refills: 0 | Status: SHIPPED | OUTPATIENT
Start: 2018-12-27 | End: 2019-04-16 | Stop reason: SDUPTHER

## 2018-12-27 NOTE — TELEPHONE ENCOUNTER
Last Visit: 7/17  Next Appt: 1/17  Previous Refill Encounter: 1/17    Requested Prescriptions     Pending Prescriptions Disp Refills    insulin detemir U-100 (LEVEMIR FLEXTOUCH) 100 unit/mL (3 mL) inpn 1 Pen 1     Sig: 3 units if blood sugar over 150.

## 2019-01-17 ENCOUNTER — OFFICE VISIT (OUTPATIENT)
Dept: FAMILY MEDICINE CLINIC | Age: 64
End: 2019-01-17

## 2019-01-17 VITALS
RESPIRATION RATE: 14 BRPM | OXYGEN SATURATION: 98 % | SYSTOLIC BLOOD PRESSURE: 135 MMHG | DIASTOLIC BLOOD PRESSURE: 71 MMHG | WEIGHT: 277 LBS | HEART RATE: 84 BPM | HEIGHT: 66 IN | BODY MASS INDEX: 44.52 KG/M2 | TEMPERATURE: 98.3 F

## 2019-01-17 DIAGNOSIS — N18.6 CONTROLLED TYPE 2 DIABETES MELLITUS WITH CHRONIC KIDNEY DISEASE ON CHRONIC DIALYSIS, WITH LONG-TERM CURRENT USE OF INSULIN (HCC): Primary | ICD-10-CM

## 2019-01-17 DIAGNOSIS — Z99.2 CONTROLLED TYPE 2 DIABETES MELLITUS WITH CHRONIC KIDNEY DISEASE ON CHRONIC DIALYSIS, WITH LONG-TERM CURRENT USE OF INSULIN (HCC): Primary | ICD-10-CM

## 2019-01-17 DIAGNOSIS — E11.22 CONTROLLED TYPE 2 DIABETES MELLITUS WITH CHRONIC KIDNEY DISEASE ON CHRONIC DIALYSIS, WITH LONG-TERM CURRENT USE OF INSULIN (HCC): Primary | ICD-10-CM

## 2019-01-17 DIAGNOSIS — Z79.4 CONTROLLED TYPE 2 DIABETES MELLITUS WITH CHRONIC KIDNEY DISEASE ON CHRONIC DIALYSIS, WITH LONG-TERM CURRENT USE OF INSULIN (HCC): Primary | ICD-10-CM

## 2019-01-17 LAB — HBA1C MFR BLD HPLC: 8.4 %

## 2019-01-17 RX ORDER — MIDODRINE HYDROCHLORIDE 5 MG/1
5 TABLET ORAL
Refills: 6 | COMMUNITY
Start: 2018-12-26

## 2019-01-17 NOTE — PROGRESS NOTES
HISTORY OF PRESENT ILLNESS Letty Whitlock is a 61 y.o. male. HPI Pt. Comes in for blood pressure, cholesterol, and diabetes check. Wife says that he was eating all the time, and not exercising. Blood sugars have been up in the 300s. Back on treadmill now. Has been doing treadmill for 5 minutes every day. Has gained over 40 lbs in last 2 years. ROS Physical Exam  
Constitutional: He appears well-developed and well-nourished. HENT:  
Right Ear: External ear normal.  
Left Ear: External ear normal.  
Mouth/Throat: Oropharynx is clear and moist.  
Neck: No thyromegaly present. Cardiovascular: Normal rate, regular rhythm, normal heart sounds and intact distal pulses. Pulmonary/Chest: Effort normal and breath sounds normal. No respiratory distress. He has no wheezes. Abdominal: Soft. Bowel sounds are normal. He exhibits no distension and no mass. There is no tenderness. There is no guarding. Musculoskeletal: Normal range of motion. He exhibits no edema. Lymphadenopathy:  
  He has no cervical adenopathy. Nursing note and vitals reviewed. ASSESSMENT and PLAN Orders Placed This Encounter  METABOLIC PANEL, COMPREHENSIVE  LIPID PANEL  
 AMB POC HEMOGLOBIN A1C  
 DISCONTD: insulin detemir U-100 (LEVEMIR FLEXTOUCH) 100 unit/mL (3 mL) inpn  insulin detemir U-100 (LEVEMIR FLEXTOUCH) 100 unit/mL (3 mL) inpn Diagnoses and all orders for this visit: 1. Controlled type 2 diabetes mellitus with chronic kidney disease on chronic dialysis, with long-term current use of insulin ([facility] -     METABOLIC PANEL, COMPREHENSIVE 
-     LIPID PANEL 
-     AMB POC HEMOGLOBIN A1C Other orders 
-     insulin detemir U-100 (LEVEMIR FLEXTOUCH) 100 unit/mL (3 mL) inpn; 6 units sc bid Follow-up Disposition: 
Return in about 4 weeks (around 2/14/2019).

## 2019-01-17 NOTE — PROGRESS NOTES
Chief Complaint Patient presents with  Hypertension  Diabetes  Cholesterol Problem  Neurologic Problem f/u 1. Have you been to the ER, urgent care clinic since your last visit? Hospitalized since your last visit? No 
 
2. Have you seen or consulted any other health care providers outside of the [address] [place] [address] since your last visit? Include any pap smears or colon screening. No  
 
Health Maintenance Due Topic Date Due  Shingrix Vaccine Age 50> (1 of 2) 08/05/2005  MICROALBUMIN Q1  07/18/2018  MEDICARE YEARLY EXAM  10/29/2018  
 FOOT EXAM Q1  01/16/2019  
 HEMOGLOBIN A1C Q6M  01/17/2019

## 2019-01-18 LAB
ALBUMIN SERPL-MCNC: 4.6 G/DL (ref 3.6–4.8)
ALBUMIN/GLOB SERPL: 1.6 {RATIO} (ref 1.2–2.2)
ALP SERPL-CCNC: 107 IU/L (ref 39–117)
ALT SERPL-CCNC: 16 IU/L (ref 0–44)
AST SERPL-CCNC: 18 IU/L (ref 0–40)
BILIRUB SERPL-MCNC: 0.3 MG/DL (ref 0–1.2)
BUN SERPL-MCNC: 42 MG/DL (ref 8–27)
BUN/CREAT SERPL: 6 (ref 10–24)
CALCIUM SERPL-MCNC: 9.3 MG/DL (ref 8.6–10.2)
CHLORIDE SERPL-SCNC: 93 MMOL/L (ref 96–106)
CHOLEST SERPL-MCNC: 198 MG/DL (ref 100–199)
CO2 SERPL-SCNC: 31 MMOL/L (ref 20–29)
CREAT SERPL-MCNC: 7.31 MG/DL (ref 0.76–1.27)
GLOBULIN SER CALC-MCNC: 2.9 G/DL (ref 1.5–4.5)
GLUCOSE SERPL-MCNC: 299 MG/DL (ref 65–99)
HDLC SERPL-MCNC: 31 MG/DL
INTERPRETATION, 910389: NORMAL
INTERPRETATION: NORMAL
LDLC SERPL CALC-MCNC: 113 MG/DL (ref 0–99)
Lab: NORMAL
PDF IMAGE, 910387: NORMAL
POTASSIUM SERPL-SCNC: 5.5 MMOL/L (ref 3.5–5.2)
PROT SERPL-MCNC: 7.5 G/DL (ref 6–8.5)
SODIUM SERPL-SCNC: 141 MMOL/L (ref 134–144)
TRIGL SERPL-MCNC: 272 MG/DL (ref 0–149)
VLDLC SERPL CALC-MCNC: 54 MG/DL (ref 5–40)

## 2019-02-08 RX ORDER — CALCIUM CARB/VITAMIN D3/VIT K1 500-100-40
1 TABLET,CHEWABLE ORAL
Qty: 100 SYRINGE | Refills: 0 | Status: SHIPPED | OUTPATIENT
Start: 2019-02-08 | End: 2019-02-11 | Stop reason: SDUPTHER

## 2019-02-08 NOTE — TELEPHONE ENCOUNTER
Last Visit: 19  Next Appt: 19  Previous Refill Encounter: 2016    Requested Prescriptions     Pending Prescriptions Disp Refills    glucose blood VI test strips (ASCENSIA AUTODISC VI, ONE TOUCH ULTRA TEST VI) strip 100 Strip 3     Sig: Check blood sugar daily    Insulin Syringe-Needle U-100 (BD INSULIN SYRINGE ULTRA-FINE) 0.3 mL 31 gauge x 5/16\" syrg 100 Syringe 0     Si Actuation(s) by Does Not Apply route every morning.

## 2019-02-11 RX ORDER — PEN NEEDLE, DIABETIC 29 G X1/2"
1 NEEDLE, DISPOSABLE MISCELLANEOUS
Qty: 100 SYRINGE | Refills: 0 | Status: SHIPPED | OUTPATIENT
Start: 2019-02-11

## 2019-02-12 RX ORDER — PEN NEEDLE, DIABETIC 30 GX3/16"
NEEDLE, DISPOSABLE MISCELLANEOUS
Qty: 1 PACKAGE | Refills: 12 | Status: SHIPPED | OUTPATIENT
Start: 2019-02-12

## 2019-02-12 RX ORDER — PEN NEEDLE, DIABETIC 30 GX3/16"
NEEDLE, DISPOSABLE MISCELLANEOUS
Qty: 1 PACKAGE | Refills: 11 | Status: SHIPPED | OUTPATIENT
Start: 2019-02-12 | End: 2019-02-19 | Stop reason: SDUPTHER

## 2019-02-19 ENCOUNTER — OFFICE VISIT (OUTPATIENT)
Dept: FAMILY MEDICINE CLINIC | Age: 64
End: 2019-02-19

## 2019-02-19 VITALS
HEIGHT: 66 IN | TEMPERATURE: 98.9 F | DIASTOLIC BLOOD PRESSURE: 70 MMHG | WEIGHT: 279.2 LBS | HEART RATE: 87 BPM | OXYGEN SATURATION: 95 % | SYSTOLIC BLOOD PRESSURE: 110 MMHG | BODY MASS INDEX: 44.87 KG/M2

## 2019-02-19 DIAGNOSIS — N18.6 CONTROLLED TYPE 2 DIABETES MELLITUS WITH CHRONIC KIDNEY DISEASE ON CHRONIC DIALYSIS, WITH LONG-TERM CURRENT USE OF INSULIN (HCC): ICD-10-CM

## 2019-02-19 DIAGNOSIS — Z23 ENCOUNTER FOR IMMUNIZATION: Primary | ICD-10-CM

## 2019-02-19 DIAGNOSIS — E11.22 CONTROLLED TYPE 2 DIABETES MELLITUS WITH CHRONIC KIDNEY DISEASE ON CHRONIC DIALYSIS, WITH LONG-TERM CURRENT USE OF INSULIN (HCC): ICD-10-CM

## 2019-02-19 DIAGNOSIS — Z79.4 CONTROLLED TYPE 2 DIABETES MELLITUS WITH CHRONIC KIDNEY DISEASE ON CHRONIC DIALYSIS, WITH LONG-TERM CURRENT USE OF INSULIN (HCC): ICD-10-CM

## 2019-02-19 DIAGNOSIS — Z00.00 ENCOUNTER FOR MEDICARE ANNUAL WELLNESS EXAM: ICD-10-CM

## 2019-02-19 DIAGNOSIS — Z99.2 CONTROLLED TYPE 2 DIABETES MELLITUS WITH CHRONIC KIDNEY DISEASE ON CHRONIC DIALYSIS, WITH LONG-TERM CURRENT USE OF INSULIN (HCC): ICD-10-CM

## 2019-02-19 LAB
GLUCOSE POC: 204 MG/DL
HBA1C MFR BLD HPLC: 8.7 %

## 2019-02-19 RX ORDER — PEN NEEDLE, DIABETIC 30 GX3/16"
NEEDLE, DISPOSABLE MISCELLANEOUS
Qty: 1 PACKAGE | Refills: 11 | Status: SHIPPED | OUTPATIENT
Start: 2019-02-19 | End: 2021-08-31 | Stop reason: SDUPTHER

## 2019-02-19 NOTE — PROGRESS NOTES
Chief Complaint   Patient presents with    Chronic Kidney Disease    Diabetes     1. Have you been to the ER, urgent care clinic since your last visit? Hospitalized since your last visit? No    2. Have you seen or consulted any other health care providers outside of the [address] [place] [address] since your last visit? Include any pap smears or colon screening.  No     Health Maintenance Due   Topic Date Due    Shingrix Vaccine Age 50> (1 of 2) 08/05/2005    MICROALBUMIN Q1  07/18/2018    MEDICARE YEARLY EXAM  10/29/2018    FOOT EXAM Q1  01/16/2019

## 2019-02-19 NOTE — PROGRESS NOTES
HISTORY OF PRESENT ILLNESS  John Babb is a 61 y.o. male. HPI Pt. Comes in for blood pressure, cholesterol, and diabetes check. We increased his levemir to 6 units bid at last visit. Morning blood sugars have been around 200-250. No hypoglycemic episodes. Has gained 42 lbs over last 2 years. Had been doing the treadmill, but stopped doing that. Had gotten up to 15 minutes a day. Sees Dr. Markell Iverson (nephrology), Dr. Severo Drought (cardiology at [facility]), Dr. Sarah Mcneal (ophthamology)  Had colonoscopy 2 years ago at [facility], was ok. Review of Systems   HENT: Negative for hearing loss. Neurological:        No falls. Uses a walker. Independent in ADLs. Psychiatric/Behavioral:        Feels safe at  Home. Doesn't drink any alcohol. Not depressed. Physical Exam   Constitutional: He appears well-developed and well-nourished. HENT:   Right Ear: External ear normal.   Left Ear: External ear normal.   Mouth/Throat: Oropharynx is clear and moist.   Neck: No thyromegaly present. Cardiovascular: Normal rate, regular rhythm, normal heart sounds and intact distal pulses. Pulmonary/Chest: Effort normal and breath sounds normal. No respiratory distress. He has no wheezes. Abdominal: Soft. Bowel sounds are normal. He exhibits no distension and no mass. There is no tenderness. There is no guarding. Musculoskeletal: Normal range of motion. He exhibits no edema. Lymphadenopathy:     He has no cervical adenopathy. Nursing note and vitals reviewed. ASSESSMENT and PLAN  Orders Placed This Encounter    AMB POC HEMOGLOBIN A1C    AMB POC GLUCOSE BLOOD, BY GLUCOSE MONITORING DEVICE    Insulin Needles, Disposable, (BD ULTRA-FINE SHORT PEN NEEDLE) 31 gauge x 5/16\" ndle    DISCONTD: insulin detemir U-100 (LEVEMIR FLEXTOUCH) 100 unit/mL (3 mL) inpn    insulin detemir U-100 (LEVEMIR FLEXTOUCH) 100 unit/mL (3 mL) inpn     Diagnoses and all orders for this visit:    1. Encounter for immunization    2.  Encounter for Medicare annual wellness exam    3. Controlled type 2 diabetes mellitus with chronic kidney disease on chronic dialysis, with long-term current use of insulin (HCC)  -     AMB POC HEMOGLOBIN A1C  -     AMB POC GLUCOSE BLOOD, BY GLUCOSE MONITORING DEVICE    Other orders  -     Insulin Needles, Disposable, (BD ULTRA-FINE SHORT PEN NEEDLE) 31 gauge x 5/16\" ndle; Use with insulin every morning  -     insulin detemir U-100 (LEVEMIR FLEXTOUCH) 100 unit/mL (3 mL) inpn; 10 units sc bid      Follow-up Disposition:  Return in about 3 months (around 5/19/2019).

## 2019-03-11 RX ORDER — ATORVASTATIN CALCIUM 80 MG/1
TABLET, FILM COATED ORAL
Qty: 90 TAB | Refills: 3 | Status: SHIPPED | OUTPATIENT
Start: 2019-03-11 | End: 2020-03-04

## 2019-04-16 DIAGNOSIS — G62.9 NEUROPATHY: ICD-10-CM

## 2019-04-17 ENCOUNTER — DOCUMENTATION ONLY (OUTPATIENT)
Dept: FAMILY MEDICINE CLINIC | Age: 64
End: 2019-04-17

## 2019-04-17 RX ORDER — PREGABALIN 150 MG/1
CAPSULE ORAL
Qty: 180 CAP | Refills: 0 | Status: SHIPPED | OUTPATIENT
Start: 2019-04-17 | End: 2019-06-07 | Stop reason: SDUPTHER

## 2019-04-17 RX ORDER — METOPROLOL TARTRATE 25 MG/1
TABLET, FILM COATED ORAL
Qty: 180 TAB | Refills: 3 | Status: SHIPPED | OUTPATIENT
Start: 2019-04-17 | End: 2020-02-11 | Stop reason: SDUPTHER

## 2019-06-07 DIAGNOSIS — G62.9 NEUROPATHY: ICD-10-CM

## 2019-06-07 NOTE — TELEPHONE ENCOUNTER
Last visit:2/19/19  Next visit:8/20/19  Previous refill 2/19/19(1 pen+1 refill)    Requested Prescriptions     Pending Prescriptions Disp Refills    pregabalin (LYRICA) 150 mg capsule 180 Cap 0     Sig: TAKE 1 CAPSULE BY MOUTH TWICE DAILY FOR FOOT PAIN    insulin detemir U-100 (LEVEMIR FLEXTOUCH) 100 unit/mL (3 mL) inpn 1 Pen 1     Sig: 10 units sc bid

## 2019-06-10 RX ORDER — PREGABALIN 150 MG/1
CAPSULE ORAL
Qty: 180 CAP | Refills: 0 | OUTPATIENT
Start: 2019-06-10 | End: 2019-07-31 | Stop reason: SDUPTHER

## 2019-07-31 ENCOUNTER — OFFICE VISIT (OUTPATIENT)
Dept: FAMILY MEDICINE CLINIC | Age: 64
End: 2019-07-31

## 2019-07-31 VITALS
TEMPERATURE: 98.9 F | HEART RATE: 106 BPM | HEIGHT: 66 IN | SYSTOLIC BLOOD PRESSURE: 132 MMHG | BODY MASS INDEX: 42.59 KG/M2 | OXYGEN SATURATION: 95 % | WEIGHT: 265 LBS | DIASTOLIC BLOOD PRESSURE: 62 MMHG | RESPIRATION RATE: 16 BRPM

## 2019-07-31 DIAGNOSIS — Z79.4 CONTROLLED TYPE 2 DIABETES MELLITUS WITH CHRONIC KIDNEY DISEASE ON CHRONIC DIALYSIS, WITH LONG-TERM CURRENT USE OF INSULIN (HCC): ICD-10-CM

## 2019-07-31 DIAGNOSIS — R50.9 CHILLS WITH FEVER: Primary | ICD-10-CM

## 2019-07-31 DIAGNOSIS — N18.6 CONTROLLED TYPE 2 DIABETES MELLITUS WITH CHRONIC KIDNEY DISEASE ON CHRONIC DIALYSIS, WITH LONG-TERM CURRENT USE OF INSULIN (HCC): ICD-10-CM

## 2019-07-31 DIAGNOSIS — Z99.2 CONTROLLED TYPE 2 DIABETES MELLITUS WITH CHRONIC KIDNEY DISEASE ON CHRONIC DIALYSIS, WITH LONG-TERM CURRENT USE OF INSULIN (HCC): ICD-10-CM

## 2019-07-31 DIAGNOSIS — G62.9 NEUROPATHY: ICD-10-CM

## 2019-07-31 DIAGNOSIS — E11.22 CONTROLLED TYPE 2 DIABETES MELLITUS WITH CHRONIC KIDNEY DISEASE ON CHRONIC DIALYSIS, WITH LONG-TERM CURRENT USE OF INSULIN (HCC): ICD-10-CM

## 2019-07-31 LAB — GLUCOSE POC: 141 MG/DL

## 2019-07-31 RX ORDER — LEVOFLOXACIN 500 MG/1
500 TABLET, FILM COATED ORAL
Qty: 10 TAB | Refills: 0 | Status: SHIPPED | OUTPATIENT
Start: 2019-07-31 | End: 2019-08-10

## 2019-07-31 RX ORDER — LEVOFLOXACIN 500 MG/1
500 TABLET, FILM COATED ORAL DAILY
Qty: 10 TAB | Refills: 0 | Status: SHIPPED | OUTPATIENT
Start: 2019-07-31 | End: 2019-07-31

## 2019-07-31 RX ORDER — CEFTRIAXONE 1 G/1
1 INJECTION, POWDER, FOR SOLUTION INTRAMUSCULAR; INTRAVENOUS ONCE
Qty: 1 VIAL | Refills: 0
Start: 2019-07-31 | End: 2019-07-31

## 2019-07-31 RX ORDER — PREGABALIN 150 MG/1
CAPSULE ORAL
Qty: 60 CAP | Refills: 0 | Status: SHIPPED | OUTPATIENT
Start: 2019-07-31 | End: 2019-09-06 | Stop reason: SDUPTHER

## 2019-07-31 NOTE — PROGRESS NOTES
HISTORY OF PRESENT ILLNESS  Harley Villasenor is a 61 y.o. male. HPI sweats, diarrhea for 7-8 days. Has been having 3-4 bms/day. Feels very weak. Tired, anorexia. Has lost 10 lbs. NO vomiting, abdominal pain. Has also been having sweats for 4 days. NO cough. No blood in stools. NO one else at home sick. ROS    Physical Exam   Constitutional: He appears well-developed and well-nourished. HENT:   Right Ear: External ear normal.   Left Ear: External ear normal.   Mouth/Throat: Oropharynx is clear and moist.   Neck: No thyromegaly present. Cardiovascular: Normal rate, regular rhythm, normal heart sounds and intact distal pulses. Pulmonary/Chest: Effort normal and breath sounds normal. No respiratory distress. He has no wheezes. Abdominal: Soft. Bowel sounds are normal. He exhibits no distension and no mass. There is no tenderness. There is no guarding. Musculoskeletal: Normal range of motion. He exhibits no edema. Lymphadenopathy:     He has no cervical adenopathy. Nursing note and vitals reviewed. t- 98.6  p-106    ASSESSMENT and PLAN  Orders Placed This Encounter    CULTURE, BLOOD    XR CHEST PA LAT    AMB POC COMPLETE CBC, AUTOMATED    AMB POC GLUCOSE BLOOD, BY GLUCOSE MONITORING DEVICE    CEFTRIAXONE SODIUM INJECTION  MG (Qty 4 for 1 gm)    THER/PROPH/DIAG INJECTION, SUBCUT/IM    DISCONTD: levoFLOXacin (LEVAQUIN) 500 mg tablet    pregabalin (LYRICA) 150 mg capsule    levoFLOXacin (LEVAQUIN) 500 mg tablet    cefTRIAXone (ROCEPHIN) 1 gram injection     Diagnoses and all orders for this visit:    1. Chills with fever  -     AMB POC COMPLETE CBC, AUTOMATED  -     XR CHEST PA LAT; Future  -     CULTURE, BLOOD  -     cefTRIAXone (ROCEPHIN) 1 gram injection; 1 g by IntraMUSCular route once for 1 dose. -     CEFTRIAXONE SODIUM INJECTION  MG  -     NM THER/PROPH/DIAG INJECTION, SUBCUT/IM    2.  Controlled type 2 diabetes mellitus with chronic kidney disease on chronic dialysis, with long-term current use of insulin (HCC)  -     AMB POC GLUCOSE BLOOD, BY GLUCOSE MONITORING DEVICE    3. Neuropathy  -     pregabalin (LYRICA) 150 mg capsule; TAKE 1 CAPSULE BY MOUTH TWICE DAILY FOR FOOT PAIN    Other orders  -     levoFLOXacin (LEVAQUIN) 500 mg tablet; Take 1 Tab by mouth every fourty-eight (48) hours for 10 days. Follow-up and Dispositions    · Return in about 5 days (around 8/5/2019). Worry about early sepsis, advised admission, pt was reluctant to do this. Will give rocephin and levaquin, recheck on Monday. To er if condition worsens.

## 2019-07-31 NOTE — PROGRESS NOTES
Chief complaint : DM  CKD   Diarrhea(4 days)  Sweating (4 days)    Pt Wt 265lbs  pts Vitals   /62 right arm sitting    02 sat 95  RR 16  Pain 0    Pt reports no changes in medications    Pt reports not going to ED urgent care or any practitioner outside outside of [facility] Complaint   Patient presents with    Diabetes    Chronic Kidney Disease    Diarrhea     x 4 days    Sweats     x 4 days       1. Have you been to the ER, urgent care clinic since your last visit? Hospitalized since your last visit? No    2. Have you seen or consulted any other health care providers outside of the [address] [place] [address] since your last visit? Include any pap smears or colon screening. No     Health Maintenance Due   Topic Date Due    Pneumococcal 0-64 years (1 of 3 - PCV13) 08/05/1961    Shingrix Vaccine Age 50> (1 of 2) 08/05/2005    MICROALBUMIN Q1  07/18/2018    FOOT EXAM Q1  01/16/2019    HEMOGLOBIN A1C Q6M  08/19/2019       After obtaining consent, and per orders of Dr. Arina Del Valle, injection of Rocephin 1g IM (Left ventro-gluteal)  given by Victorino Johns LPN. Patient instructed to remain in clinic for 20 minutes afterwards, and to report any adverse reaction to me immediately. Pt tolerated injection well and gave no complaints of negative side effects.

## 2019-08-05 ENCOUNTER — OFFICE VISIT (OUTPATIENT)
Dept: FAMILY MEDICINE CLINIC | Age: 64
End: 2019-08-05

## 2019-08-05 VITALS
SYSTOLIC BLOOD PRESSURE: 98 MMHG | OXYGEN SATURATION: 96 % | HEART RATE: 92 BPM | DIASTOLIC BLOOD PRESSURE: 63 MMHG | TEMPERATURE: 97.9 F | RESPIRATION RATE: 16 BRPM | HEIGHT: 66 IN | BODY MASS INDEX: 42.77 KG/M2

## 2019-08-05 DIAGNOSIS — K52.9 GASTROENTERITIS: Primary | ICD-10-CM

## 2019-08-06 LAB — BACTERIA BLD CULT: NORMAL

## 2019-09-06 DIAGNOSIS — G62.9 NEUROPATHY: ICD-10-CM

## 2019-09-06 RX ORDER — PREGABALIN 150 MG/1
CAPSULE ORAL
Qty: 60 CAP | Refills: 0 | OUTPATIENT
Start: 2019-09-06 | End: 2020-02-11 | Stop reason: ALTCHOICE

## 2019-09-06 NOTE — TELEPHONE ENCOUNTER
Please check  for the last refill of Lyrica before prescribing new Rx.       Last visit:8/05/19  Next visit: not scheduled at this time  Previous refill (6/10/19 1pen+1R)    Requested Prescriptions     Pending Prescriptions Disp Refills    insulin detemir U-100 (LEVEMIR FLEXTOUCH) 100 unit/mL (3 mL) inpn 1 Pen 1     Sig: 10 units sc bid

## 2020-02-07 NOTE — TELEPHONE ENCOUNTER
Last visit:8/5/19  Next visit:not scheduled at this time  Previous refill 9/6/19(1 pen +1R)    Requested Prescriptions     Pending Prescriptions Disp Refills    insulin detemir U-100 (LEVEMIR FLEXTOUCH) 100 unit/mL (3 mL) inpn 1 Pen 1     Sig: 10 units sc bid

## 2020-02-11 ENCOUNTER — OFFICE VISIT (OUTPATIENT)
Dept: FAMILY MEDICINE CLINIC | Age: 65
End: 2020-02-11

## 2020-02-11 VITALS
RESPIRATION RATE: 16 BRPM | OXYGEN SATURATION: 93 % | TEMPERATURE: 97.5 F | WEIGHT: 262.4 LBS | HEIGHT: 66 IN | DIASTOLIC BLOOD PRESSURE: 96 MMHG | HEART RATE: 67 BPM | BODY MASS INDEX: 42.17 KG/M2 | SYSTOLIC BLOOD PRESSURE: 188 MMHG

## 2020-02-11 DIAGNOSIS — Z99.2 CONTROLLED TYPE 2 DIABETES MELLITUS WITH CHRONIC KIDNEY DISEASE ON CHRONIC DIALYSIS, WITH LONG-TERM CURRENT USE OF INSULIN (HCC): ICD-10-CM

## 2020-02-11 DIAGNOSIS — Z12.5 PROSTATE CANCER SCREENING: ICD-10-CM

## 2020-02-11 DIAGNOSIS — R61 NIGHT SWEATS: Primary | ICD-10-CM

## 2020-02-11 DIAGNOSIS — N18.6 CONTROLLED TYPE 2 DIABETES MELLITUS WITH CHRONIC KIDNEY DISEASE ON CHRONIC DIALYSIS, WITH LONG-TERM CURRENT USE OF INSULIN (HCC): ICD-10-CM

## 2020-02-11 DIAGNOSIS — E11.22 CONTROLLED TYPE 2 DIABETES MELLITUS WITH CHRONIC KIDNEY DISEASE ON CHRONIC DIALYSIS, WITH LONG-TERM CURRENT USE OF INSULIN (HCC): ICD-10-CM

## 2020-02-11 DIAGNOSIS — Z79.4 CONTROLLED TYPE 2 DIABETES MELLITUS WITH CHRONIC KIDNEY DISEASE ON CHRONIC DIALYSIS, WITH LONG-TERM CURRENT USE OF INSULIN (HCC): ICD-10-CM

## 2020-02-11 DIAGNOSIS — R39.12 WEAK URINARY STREAM: ICD-10-CM

## 2020-02-11 DIAGNOSIS — Z23 ENCOUNTER FOR IMMUNIZATION: ICD-10-CM

## 2020-02-11 LAB — HBA1C MFR BLD HPLC: 6.1 %

## 2020-02-11 RX ORDER — AMLODIPINE BESYLATE 5 MG/1
5 TABLET ORAL DAILY
Qty: 90 TAB | Refills: 3 | Status: SHIPPED | OUTPATIENT
Start: 2020-02-11 | End: 2021-03-04

## 2020-02-11 RX ORDER — ACETAMINOPHEN 325 MG/1
650 TABLET ORAL
Qty: 50 TAB | Refills: 5 | Status: SHIPPED | OUTPATIENT
Start: 2020-02-11

## 2020-02-11 NOTE — PROGRESS NOTES
Chief Complaint   Patient presents with    Follow-up     6 months/Diabetes       Night sweats x 1 month    1. Have you been to the ER, urgent care clinic since your last visit? Hospitalized since your last visit? No    2. Have you seen or consulted any other health care providers outside of the [address] [place] [address] since your last visit? Include any pap smears or colon screening.   No

## 2020-02-11 NOTE — PROGRESS NOTES
HISTORY OF PRESENT ILLNESS  Drea Glass is a 59 y.o. male. HPI Pt. Comes in for blood pressure, cholesterol, and diabetes check. Has been having some night sweats, stiffness in R shoulder for 3 weeks  No complaints of chest pain, shortness of breath, TIAs, claudication or edema. Blood sugars in the 150s. No hypoglycemic episodes. ROS    Physical Exam  Vitals signs and nursing note reviewed. Constitutional:       Appearance: He is well-developed. HENT:      Right Ear: External ear normal.      Left Ear: External ear normal.   Neck:      Thyroid: No thyromegaly. Cardiovascular:      Rate and Rhythm: Normal rate and regular rhythm. Heart sounds: Normal heart sounds. Pulmonary:      Effort: Pulmonary effort is normal. No respiratory distress. Breath sounds: Normal breath sounds. No wheezing. Abdominal:      General: Bowel sounds are normal. There is no distension. Palpations: Abdomen is soft. There is no mass. Tenderness: There is no abdominal tenderness. There is no guarding. Musculoskeletal: Normal range of motion. Comments:   r shoulder- positive impingement sign   Lymphadenopathy:      Cervical: No cervical adenopathy. Skin:     Comments: No supraclavicular axillary or cervical adenopathy         ASSESSMENT and PLAN  Orders Placed This Encounter    XR CHEST PA LAT    Pneumococcal conjugate 13 valent, IM ([id number])    METABOLIC PANEL, COMPREHENSIVE    CBC WITH AUTOMATED DIFF    LIPID PANEL    PROSTATE SPECIFIC AG    AMB POC HEMOGLOBIN A1C    DISCONTD: insulin detemir U-100 (LEVEMIR FLEXTOUCH) 100 unit/mL (3 mL) inpn    amLODIPine (NORVASC) 5 mg tablet    insulin detemir U-100 (LEVEMIR FLEXTOUCH) 100 unit/mL (3 mL) inpn    acetaminophen (TYLENOL) 325 mg tablet     Diagnoses and all orders for this visit:    1. Night sweats  -     XR CHEST PA LAT; Future    2. Encounter for immunization  -     PNEUMOCOCCAL CONJ VACCINE 13 VALENT IM    3.  Controlled type 2 diabetes mellitus with chronic kidney disease on chronic dialysis, with long-term current use of insulin (HCC)  -     METABOLIC PANEL, COMPREHENSIVE  -     AMB POC HEMOGLOBIN A1C  -     CBC WITH AUTOMATED DIFF  -     LIPID PANEL    4. Prostate cancer screening    5. Weak urinary stream  -     PSA, DIAGNOSTIC (PROSTATE SPECIFIC AG)    Other orders  -     amLODIPine (NORVASC) 5 mg tablet; Take 1 Tab by mouth daily. For blood pressure  -     insulin detemir U-100 (LEVEMIR FLEXTOUCH) 100 unit/mL (3 mL) inpn; 8 units sc bid  -     acetaminophen (TYLENOL) 325 mg tablet; Take 2 Tabs by mouth every six (6) hours as needed for Pain (shoulder pain). Follow-up and Dispositions    · Return in about 6 months (around 8/11/2020).

## 2020-02-12 LAB
ALBUMIN SERPL-MCNC: 4.6 G/DL (ref 3.8–4.8)
ALBUMIN/GLOB SERPL: 1.5 {RATIO} (ref 1.2–2.2)
ALP SERPL-CCNC: 74 IU/L (ref 39–117)
ALT SERPL-CCNC: 9 IU/L (ref 0–44)
AST SERPL-CCNC: 11 IU/L (ref 0–40)
BASOPHILS # BLD AUTO: 0.1 X10E3/UL (ref 0–0.2)
BASOPHILS NFR BLD AUTO: 1 %
BILIRUB SERPL-MCNC: 0.3 MG/DL (ref 0–1.2)
BUN SERPL-MCNC: 49 MG/DL (ref 8–27)
BUN/CREAT SERPL: 6 (ref 10–24)
CALCIUM SERPL-MCNC: 9.1 MG/DL (ref 8.6–10.2)
CHLORIDE SERPL-SCNC: 94 MMOL/L (ref 96–106)
CHOLEST SERPL-MCNC: 172 MG/DL (ref 100–199)
CO2 SERPL-SCNC: 27 MMOL/L (ref 20–29)
CREAT SERPL-MCNC: 7.68 MG/DL (ref 0.76–1.27)
EOSINOPHIL # BLD AUTO: 0.2 X10E3/UL (ref 0–0.4)
EOSINOPHIL NFR BLD AUTO: 2 %
ERYTHROCYTE [DISTWIDTH] IN BLOOD BY AUTOMATED COUNT: 12.2 % (ref 11.6–15.4)
GLOBULIN SER CALC-MCNC: 3.1 G/DL (ref 1.5–4.5)
GLUCOSE SERPL-MCNC: 103 MG/DL (ref 65–99)
HCT VFR BLD AUTO: 32.3 % (ref 37.5–51)
HDLC SERPL-MCNC: 32 MG/DL
HGB BLD-MCNC: 10.5 G/DL (ref 13–17.7)
IMM GRANULOCYTES # BLD AUTO: 0 X10E3/UL (ref 0–0.1)
IMM GRANULOCYTES NFR BLD AUTO: 1 %
INTERPRETATION, 910389: NORMAL
INTERPRETATION: NORMAL
LDLC SERPL CALC-MCNC: 99 MG/DL (ref 0–99)
LYMPHOCYTES # BLD AUTO: 2.1 X10E3/UL (ref 0.7–3.1)
LYMPHOCYTES NFR BLD AUTO: 27 %
Lab: NORMAL
MCH RBC QN AUTO: 33.8 PG (ref 26.6–33)
MCHC RBC AUTO-ENTMCNC: 32.5 G/DL (ref 31.5–35.7)
MCV RBC AUTO: 104 FL (ref 79–97)
MONOCYTES # BLD AUTO: 0.7 X10E3/UL (ref 0.1–0.9)
MONOCYTES NFR BLD AUTO: 9 %
NEUTROPHILS # BLD AUTO: 4.6 X10E3/UL (ref 1.4–7)
NEUTROPHILS NFR BLD AUTO: 60 %
PDF IMAGE, 910387: NORMAL
PLATELET # BLD AUTO: 248 X10E3/UL (ref 150–450)
POTASSIUM SERPL-SCNC: 5.1 MMOL/L (ref 3.5–5.2)
PROT SERPL-MCNC: 7.7 G/DL (ref 6–8.5)
PSA SERPL-MCNC: 0.6 NG/ML (ref 0–4)
RBC # BLD AUTO: 3.11 X10E6/UL (ref 4.14–5.8)
SODIUM SERPL-SCNC: 141 MMOL/L (ref 134–144)
TRIGL SERPL-MCNC: 204 MG/DL (ref 0–149)
VLDLC SERPL CALC-MCNC: 41 MG/DL (ref 5–40)
WBC # BLD AUTO: 7.7 X10E3/UL (ref 3.4–10.8)

## 2020-03-04 RX ORDER — ATORVASTATIN CALCIUM 80 MG/1
TABLET, FILM COATED ORAL
Qty: 90 TAB | Refills: 3 | Status: SHIPPED | OUTPATIENT
Start: 2020-03-04 | End: 2021-02-17

## 2020-04-07 RX ORDER — METOPROLOL TARTRATE 25 MG/1
TABLET, FILM COATED ORAL
Qty: 180 TAB | Refills: 3 | Status: SHIPPED | OUTPATIENT
Start: 2020-04-07 | End: 2021-03-22

## 2020-07-21 NOTE — TELEPHONE ENCOUNTER
Last visit: 2/11/20  Next visit: 8/11/20  Previous refill 2/8/19(100+3R)    Requested Prescriptions     Pending Prescriptions Disp Refills    glucose blood VI test strips (ASCENSIA AUTODISC VI, ONE TOUCH ULTRA TEST VI) strip 100 Strip 3     Sig: Check blood sugar daily

## 2020-09-15 ENCOUNTER — OFFICE VISIT (OUTPATIENT)
Dept: FAMILY MEDICINE CLINIC | Age: 65
End: 2020-09-15
Payer: COMMERCIAL

## 2020-09-15 VITALS
WEIGHT: 264.6 LBS | SYSTOLIC BLOOD PRESSURE: 163 MMHG | HEIGHT: 69 IN | RESPIRATION RATE: 16 BRPM | TEMPERATURE: 98 F | HEART RATE: 89 BPM | BODY MASS INDEX: 39.19 KG/M2 | OXYGEN SATURATION: 99 % | DIASTOLIC BLOOD PRESSURE: 88 MMHG

## 2020-09-15 DIAGNOSIS — E11.22 CONTROLLED TYPE 2 DIABETES MELLITUS WITH CHRONIC KIDNEY DISEASE ON CHRONIC DIALYSIS, WITH LONG-TERM CURRENT USE OF INSULIN (HCC): ICD-10-CM

## 2020-09-15 DIAGNOSIS — N18.6 CONTROLLED TYPE 2 DIABETES MELLITUS WITH CHRONIC KIDNEY DISEASE ON CHRONIC DIALYSIS, WITH LONG-TERM CURRENT USE OF INSULIN (HCC): ICD-10-CM

## 2020-09-15 DIAGNOSIS — Z23 NEEDS FLU SHOT: ICD-10-CM

## 2020-09-15 DIAGNOSIS — Z99.2 CONTROLLED TYPE 2 DIABETES MELLITUS WITH CHRONIC KIDNEY DISEASE ON CHRONIC DIALYSIS, WITH LONG-TERM CURRENT USE OF INSULIN (HCC): ICD-10-CM

## 2020-09-15 DIAGNOSIS — Z00.00 ENCOUNTER FOR MEDICARE ANNUAL WELLNESS EXAM: Primary | ICD-10-CM

## 2020-09-15 DIAGNOSIS — E11.42 DIABETIC POLYNEUROPATHY ASSOCIATED WITH TYPE 2 DIABETES MELLITUS (HCC): ICD-10-CM

## 2020-09-15 DIAGNOSIS — Z79.4 CONTROLLED TYPE 2 DIABETES MELLITUS WITH CHRONIC KIDNEY DISEASE ON CHRONIC DIALYSIS, WITH LONG-TERM CURRENT USE OF INSULIN (HCC): ICD-10-CM

## 2020-09-15 LAB
GLUCOSE POC: 147 MG/DL
HBA1C MFR BLD HPLC: 6.8 %

## 2020-09-15 PROCEDURE — 99213 OFFICE O/P EST LOW 20 MIN: CPT | Performed by: FAMILY MEDICINE

## 2020-09-15 PROCEDURE — 90694 VACC AIIV4 NO PRSRV 0.5ML IM: CPT

## 2020-09-15 PROCEDURE — 90471 IMMUNIZATION ADMIN: CPT

## 2020-09-15 PROCEDURE — 83036 HEMOGLOBIN GLYCOSYLATED A1C: CPT | Performed by: FAMILY MEDICINE

## 2020-09-15 PROCEDURE — 82962 GLUCOSE BLOOD TEST: CPT | Performed by: FAMILY MEDICINE

## 2020-09-15 RX ORDER — PREGABALIN 75 MG/1
75 CAPSULE ORAL 2 TIMES DAILY
Qty: 60 CAP | Refills: 5 | Status: SHIPPED | OUTPATIENT
Start: 2020-09-15

## 2020-09-15 RX ORDER — SODIUM POLYSTYRENE SULFONATE 4.1 MEQ/G
POWDER, FOR SUSPENSION ORAL; RECTAL
COMMUNITY
Start: 2020-08-29

## 2020-09-15 NOTE — PROGRESS NOTES
Chief Complaint   Patient presents with    Follow-up     1. Have you been to the ER, urgent care clinic since your last visit? Hospitalized since your last visit? No    2. Have you seen or consulted any other health care providers outside of the [address] [place] [address] since your last visit? Include any pap smears or colon screening. Yes OPTHAMOLOGY    Verbal Order received from Dr. Jarrell Bennett for fluad given IM in right arm.

## 2020-09-15 NOTE — PROGRESS NOTES
HISTORY OF PRESENT ILLNESS  Erlin Crockett is a 72 y.o. male. HPI  Pt. Comes in for blood pressure, cholesterol, and diabetes check. Still doing dialysis at [facility] on [address] Has  Several skin tags that need to be removed. Not checking blood sugars, is out of test strips. No hypoglycemic episodes. Has been taking amlodipine every other day. No complaints of chest pain, shortness of breath, TIAs, claudication or edema. Needs medicare wellness exam. Sees Dr. Royer Rolon (renal), Dr. Gonzalez(ophthalmology), Dr. Dilia Tejada (cardiology)Last colonoscopy was 5 years ago at [facility], was normal. UTD on immunizations. No official advanced directives. Used to take lyrica for leg pain, but went off it. Legs are hurting agaoin  Review of Systems   HENT: Negative for hearing loss. Neurological:        No  Falls, uses a wheelchair. Independent in adls. Memory ok   Psychiatric/Behavioral: Positive for depression (gets depressed at dialysis). Occasional alcoholic drink. Physical Exam  Vitals signs and nursing note reviewed. Constitutional:       Appearance: He is well-developed. HENT:      Right Ear: External ear normal.      Left Ear: External ear normal.   Neck:      Thyroid: No thyromegaly. Cardiovascular:      Rate and Rhythm: Normal rate and regular rhythm. Heart sounds: Normal heart sounds. Pulmonary:      Effort: Pulmonary effort is normal. No respiratory distress. Breath sounds: Normal breath sounds. No wheezing. Abdominal:      General: Bowel sounds are normal. There is no distension. Palpations: Abdomen is soft. There is no mass. Tenderness: There is no abdominal tenderness. There is no guarding. Musculoskeletal: Normal range of motion. General: Swelling present. Comments: 2+ edema bilaterally   Lymphadenopathy:      Cervical: No cervical adenopathy.          ASSESSMENT and PLAN  Orders Placed This Encounter    Influenza Vaccine, QUAD, 65 Yrs +  IM  (Fluad [id number])  AMB POC HEMOGLOBIN A1C    AMB POC GLUCOSE BLOOD, BY GLUCOSE MONITORING DEVICE    pregabalin (LYRICA) 75 mg capsule    glucose blood VI test strips (ASCENSIA AUTODISC VI, ONE TOUCH ULTRA TEST VI) strip     Diagnoses and all orders for this visit:    1. Encounter for Medicare annual wellness exam    2. Needs flu shot  -     FLU (FLUAD QUAD INFLUENZA VACCINE,QUAD,ADJUVANTED)    3. Controlled type 2 diabetes mellitus with chronic kidney disease on chronic dialysis, with long-term current use of insulin (HCC)  -     AMB POC HEMOGLOBIN A1C  -     AMB POC GLUCOSE BLOOD, BY GLUCOSE MONITORING DEVICE    4. Diabetic polyneuropathy associated with type 2 diabetes mellitus (HCC)  -     pregabalin (LYRICA) 75 mg capsule; Take 1 Cap by mouth two (2) times a day. Max Daily Amount: 150 mg. Other orders  -     glucose blood VI test strips (ASCENSIA AUTODISC VI, ONE TOUCH ULTRA TEST VI) strip; Check blood sugar daily      Follow-up and Dispositions    · Return in about 6 months (around 3/15/2021).

## 2021-02-10 ENCOUNTER — HOSPITAL ENCOUNTER (OUTPATIENT)
Dept: LAB | Age: 66
Discharge: HOME | End: 2021-02-10

## 2021-02-10 LAB — POTASSIUM SERPL-SCNC: 3.8 MMOL/L (ref 3.5–5.1)

## 2021-02-10 PROCEDURE — 84132 ASSAY OF SERUM POTASSIUM: CPT

## 2021-02-10 PROCEDURE — 99000 SPECIMEN HANDLING OFFICE-LAB: CPT

## 2021-02-17 RX ORDER — ATORVASTATIN CALCIUM 80 MG/1
TABLET, FILM COATED ORAL
Qty: 90 TAB | Refills: 3 | Status: SHIPPED | OUTPATIENT
Start: 2021-02-17 | End: 2022-02-05

## 2021-03-15 ENCOUNTER — OFFICE VISIT (OUTPATIENT)
Dept: FAMILY MEDICINE CLINIC | Age: 66
End: 2021-03-15
Payer: MEDICARE

## 2021-03-15 VITALS
TEMPERATURE: 97.1 F | RESPIRATION RATE: 16 BRPM | SYSTOLIC BLOOD PRESSURE: 135 MMHG | HEIGHT: 69 IN | BODY MASS INDEX: 40.35 KG/M2 | HEART RATE: 74 BPM | OXYGEN SATURATION: 97 % | WEIGHT: 272.4 LBS | DIASTOLIC BLOOD PRESSURE: 75 MMHG

## 2021-03-15 DIAGNOSIS — E11.42 DIABETIC POLYNEUROPATHY ASSOCIATED WITH TYPE 2 DIABETES MELLITUS (HCC): ICD-10-CM

## 2021-03-15 DIAGNOSIS — E78.00 HYPERCHOLESTEROLEMIA: ICD-10-CM

## 2021-03-15 DIAGNOSIS — R61 NIGHT SWEATS: ICD-10-CM

## 2021-03-15 DIAGNOSIS — E11.9 DIABETES MELLITUS WITHOUT COMPLICATION (HCC): Primary | ICD-10-CM

## 2021-03-15 LAB — HBA1C MFR BLD HPLC: 9.3 %

## 2021-03-15 PROCEDURE — 3046F HEMOGLOBIN A1C LEVEL >9.0%: CPT | Performed by: FAMILY MEDICINE

## 2021-03-15 PROCEDURE — 83036 HEMOGLOBIN GLYCOSYLATED A1C: CPT | Performed by: FAMILY MEDICINE

## 2021-03-15 PROCEDURE — G8427 DOCREV CUR MEDS BY ELIG CLIN: HCPCS | Performed by: FAMILY MEDICINE

## 2021-03-15 PROCEDURE — 1101F PT FALLS ASSESS-DOCD LE1/YR: CPT | Performed by: FAMILY MEDICINE

## 2021-03-15 PROCEDURE — G8417 CALC BMI ABV UP PARAM F/U: HCPCS | Performed by: FAMILY MEDICINE

## 2021-03-15 PROCEDURE — G0463 HOSPITAL OUTPT CLINIC VISIT: HCPCS | Performed by: FAMILY MEDICINE

## 2021-03-15 PROCEDURE — G8510 SCR DEP NEG, NO PLAN REQD: HCPCS | Performed by: FAMILY MEDICINE

## 2021-03-15 PROCEDURE — 2022F DILAT RTA XM EVC RTNOPTHY: CPT | Performed by: FAMILY MEDICINE

## 2021-03-15 PROCEDURE — 99213 OFFICE O/P EST LOW 20 MIN: CPT | Performed by: FAMILY MEDICINE

## 2021-03-15 PROCEDURE — 3017F COLORECTAL CA SCREEN DOC REV: CPT | Performed by: FAMILY MEDICINE

## 2021-03-15 PROCEDURE — G8536 NO DOC ELDER MAL SCRN: HCPCS | Performed by: FAMILY MEDICINE

## 2021-03-15 PROCEDURE — G9231 DOC ESRD DIA TRANS PREG: HCPCS | Performed by: FAMILY MEDICINE

## 2021-03-15 RX ORDER — TIMOLOL MALEATE 5 MG/ML
SOLUTION/ DROPS OPHTHALMIC
COMMUNITY
Start: 2021-03-07

## 2021-03-15 RX ORDER — SODIUM ZIRCONIUM CYCLOSILICATE 5 G/5G
POWDER, FOR SUSPENSION ORAL
COMMUNITY
Start: 2021-01-20

## 2021-03-15 NOTE — PROGRESS NOTES
Chief Complaint   Patient presents with    Hypertension    Diabetes    Cholesterol Problem     1. Have you been to the ER, urgent care clinic since your last visit? Hospitalized since your last visit? No    2. Have you seen or consulted any other health care providers outside of the [address] [place] [address] since your last visit? Include any pap smears or colon screening.  Yes dialysis Tried calling pts daughter, no answer. Left message.

## 2021-03-15 NOTE — PROGRESS NOTES
HISTORY OF PRESENT ILLNESS  Caroline Das is a 72 y.o. male. HPI Pt. Comes in for blood pressure, cholesterol, and diabetes check. Has been having night sweats for 3 months. No cough, weight loss, abdominal pains, swollen glands anywhere. No pains elsewhere. Blood sugars have been in the 200s. No hypoglycemic episodes. ROS    Physical Exam  Vitals signs and nursing note reviewed. Constitutional:       Appearance: He is well-developed. HENT:      Right Ear: External ear normal.      Left Ear: External ear normal.   Neck:      Thyroid: No thyromegaly. Cardiovascular:      Rate and Rhythm: Normal rate and regular rhythm. Heart sounds: Normal heart sounds. Pulmonary:      Effort: Pulmonary effort is normal. No respiratory distress. Breath sounds: Normal breath sounds. No wheezing. Abdominal:      General: Bowel sounds are normal. There is no distension. Palpations: Abdomen is soft. There is no mass. Tenderness: There is no abdominal tenderness. There is no guarding. Musculoskeletal: Normal range of motion. Lymphadenopathy:      Cervical: No cervical adenopathy. ASSESSMENT and PLAN  Orders Placed This Encounter    QUANTIFERON-TB GOLD PLUS    CBC WITH AUTOMATED DIFF    HIV 1/2 AG/AB, 4TH GENERATION,W RFLX CONFIRM    TSH 3RD GENERATION    Z7,EZIR(WBKNUT)    METABOLIC PANEL, COMPREHENSIVE    LIPID PANEL    SED RATE (ESR)    LDH    AMB POC HEMOGLOBIN A1C     Diagnoses and all orders for this visit:    1. Diabetes mellitus without complication (HCC)  -     AMB POC HEMOGLOBIN A1C    2. Night sweats  -     CBC WITH AUTOMATED DIFF; Future  -     HIV 1/2 AG/AB, 4TH GENERATION,W RFLX CONFIRM; Future  -     TSH 3RD GENERATION; Future  -     T4,FREE(DIRECT); Future  -     METABOLIC PANEL, COMPREHENSIVE; Future  -     SED RATE (ESR); Future  -     LD; Future  -     QUANTIFERON-TB GOLD PLUS; Future    3.  Diabetic polyneuropathy associated with type 2 diabetes mellitus ([facility]    4. Hypercholesterolemia  -     LIPID PANEL;  Future          To come by tomorrow pm for cxr

## 2021-03-17 ENCOUNTER — HOSPITAL ENCOUNTER (OUTPATIENT)
Dept: LAB | Age: 66
Discharge: HOME | End: 2021-03-17
Payer: MEDICARE

## 2021-03-17 PROCEDURE — 85025 COMPLETE CBC W/AUTO DIFF WBC: CPT

## 2021-03-17 PROCEDURE — 87389 HIV-1 AG W/HIV-1&-2 AB AG IA: CPT

## 2021-03-17 PROCEDURE — 83615 LACTATE (LD) (LDH) ENZYME: CPT

## 2021-03-17 PROCEDURE — 80061 LIPID PANEL: CPT

## 2021-03-17 PROCEDURE — 86480 TB TEST CELL IMMUN MEASURE: CPT

## 2021-03-17 PROCEDURE — 84443 ASSAY THYROID STIM HORMONE: CPT

## 2021-03-17 PROCEDURE — 85651 RBC SED RATE NONAUTOMATED: CPT

## 2021-03-17 PROCEDURE — 84439 ASSAY OF FREE THYROXINE: CPT

## 2021-03-17 PROCEDURE — 80053 COMPREHEN METABOLIC PANEL: CPT

## 2021-03-17 PROCEDURE — 36415 COLL VENOUS BLD VENIPUNCTURE: CPT

## 2021-03-18 ENCOUNTER — TELEPHONE (OUTPATIENT)
Dept: FAMILY MEDICINE CLINIC | Age: 66
End: 2021-03-18

## 2021-03-18 NOTE — TELEPHONE ENCOUNTER
Patient notified chest xray needs to be rescheduled. Message left on wife's voice mail marked urgent to call office to reschedule xray.

## 2021-03-20 LAB
ALBUMIN SERPL-MCNC: 4.4 G/DL (ref 3.8–4.8)
ALBUMIN/GLOB SERPL: 1.5 {RATIO} (ref 1.2–2.2)
ALP SERPL-CCNC: 79 IU/L (ref 39–117)
ALT SERPL-CCNC: 13 IU/L (ref 0–44)
AST SERPL-CCNC: 14 IU/L (ref 0–40)
BASOPHILS # BLD AUTO: 0 X10E3/UL (ref 0–0.2)
BASOPHILS NFR BLD AUTO: 1 %
BILIRUB SERPL-MCNC: 0.4 MG/DL (ref 0–1.2)
BUN SERPL-MCNC: 34 MG/DL (ref 8–27)
BUN/CREAT SERPL: 6 (ref 10–24)
CALCIUM SERPL-MCNC: 8.4 MG/DL (ref 8.6–10.2)
CHLORIDE SERPL-SCNC: 89 MMOL/L (ref 96–106)
CHOLEST SERPL-MCNC: 209 MG/DL (ref 100–199)
CO2 SERPL-SCNC: 29 MMOL/L (ref 20–29)
CREAT SERPL-MCNC: 6.16 MG/DL (ref 0.76–1.27)
EOSINOPHIL # BLD AUTO: 0.1 X10E3/UL (ref 0–0.4)
EOSINOPHIL NFR BLD AUTO: 2 %
ERYTHROCYTE [DISTWIDTH] IN BLOOD BY AUTOMATED COUNT: 11.8 % (ref 11.6–15.4)
ERYTHROCYTE [SEDIMENTATION RATE] IN BLOOD BY WESTERGREN METHOD: 45 MM/HR (ref 0–30)
GAMMA INTERFERON BACKGROUND BLD IA-ACNC: 0.03 IU/ML
GLOBULIN SER CALC-MCNC: 3 G/DL (ref 1.5–4.5)
GLUCOSE SERPL-MCNC: 280 MG/DL (ref 65–99)
HCT VFR BLD AUTO: 32.7 % (ref 37.5–51)
HDLC SERPL-MCNC: 29 MG/DL
HGB BLD-MCNC: 11.1 G/DL (ref 13–17.7)
HIV 1+2 AB+HIV1 P24 AG SERPL QL IA: NON REACTIVE
IMM GRANULOCYTES # BLD AUTO: 0 X10E3/UL (ref 0–0.1)
IMM GRANULOCYTES NFR BLD AUTO: 0 %
IMP & REVIEW OF LAB RESULTS: NORMAL
INTERPRETATION: NORMAL
LDH SERPL-CCNC: 201 IU/L (ref 121–224)
LDLC SERPL CALC-MCNC: 125 MG/DL (ref 0–99)
LYMPHOCYTES # BLD AUTO: 1.4 X10E3/UL (ref 0.7–3.1)
LYMPHOCYTES NFR BLD AUTO: 22 %
M TB IFN-G BLD-IMP: NEGATIVE
M TB IFN-G CD4+ BCKGRND COR BLD-ACNC: 0.06 IU/ML
MCH RBC QN AUTO: 34.7 PG (ref 26.6–33)
MCHC RBC AUTO-ENTMCNC: 33.9 G/DL (ref 31.5–35.7)
MCV RBC AUTO: 102 FL (ref 79–97)
MITOGEN IGNF BLD-ACNC: >10 IU/ML
MONOCYTES # BLD AUTO: 0.6 X10E3/UL (ref 0.1–0.9)
MONOCYTES NFR BLD AUTO: 10 %
NEUTROPHILS # BLD AUTO: 4 X10E3/UL (ref 1.4–7)
NEUTROPHILS NFR BLD AUTO: 65 %
PDF IMAGE, 910387: NORMAL
PLATELET # BLD AUTO: 197 X10E3/UL (ref 150–450)
POTASSIUM SERPL-SCNC: 4.8 MMOL/L (ref 3.5–5.2)
PROT SERPL-MCNC: 7.4 G/DL (ref 6–8.5)
QUANTIFERON INCUBATION, QF1T: NORMAL
QUANTIFERON TB2 AG: 0.05 IU/ML
RBC # BLD AUTO: 3.2 X10E6/UL (ref 4.14–5.8)
SERVICE CMNT-IMP: NORMAL
SODIUM SERPL-SCNC: 136 MMOL/L (ref 134–144)
T4 FREE SERPL-MCNC: 0.9 NG/DL (ref 0.82–1.77)
TRIGL SERPL-MCNC: 312 MG/DL (ref 0–149)
TSH SERPL DL<=0.005 MIU/L-ACNC: 1.67 UIU/ML (ref 0.45–4.5)
VLDLC SERPL CALC-MCNC: 55 MG/DL (ref 5–40)
WBC # BLD AUTO: 6.2 X10E3/UL (ref 3.4–10.8)

## 2021-03-22 RX ORDER — METOPROLOL TARTRATE 25 MG/1
TABLET, FILM COATED ORAL
Qty: 180 TAB | Refills: 3 | Status: SHIPPED | OUTPATIENT
Start: 2021-03-22

## 2021-08-31 ENCOUNTER — OFFICE VISIT (OUTPATIENT)
Dept: FAMILY MEDICINE CLINIC | Age: 66
End: 2021-08-31
Payer: MEDICARE

## 2021-08-31 VITALS
HEIGHT: 69 IN | OXYGEN SATURATION: 96 % | SYSTOLIC BLOOD PRESSURE: 151 MMHG | HEART RATE: 85 BPM | DIASTOLIC BLOOD PRESSURE: 55 MMHG | RESPIRATION RATE: 16 BRPM | TEMPERATURE: 97.6 F | BODY MASS INDEX: 41.2 KG/M2 | WEIGHT: 278.2 LBS

## 2021-08-31 DIAGNOSIS — E11.9 DIABETES MELLITUS WITHOUT COMPLICATION (HCC): ICD-10-CM

## 2021-08-31 DIAGNOSIS — E78.00 HYPERCHOLESTEROLEMIA: ICD-10-CM

## 2021-08-31 DIAGNOSIS — E66.01 OBESITY, CLASS III, BMI 40-49.9 (MORBID OBESITY) (HCC): ICD-10-CM

## 2021-08-31 DIAGNOSIS — Z79.4 TYPE 2 DIABETES MELLITUS WITH DIABETIC NEUROPATHY, WITH LONG-TERM CURRENT USE OF INSULIN (HCC): ICD-10-CM

## 2021-08-31 DIAGNOSIS — E11.40 TYPE 2 DIABETES MELLITUS WITH DIABETIC NEUROPATHY, WITH LONG-TERM CURRENT USE OF INSULIN (HCC): ICD-10-CM

## 2021-08-31 DIAGNOSIS — Z12.5 PROSTATE CANCER SCREENING: ICD-10-CM

## 2021-08-31 DIAGNOSIS — R39.12 WEAK URINARY STREAM: Primary | ICD-10-CM

## 2021-08-31 DIAGNOSIS — I10 ESSENTIAL HYPERTENSION: ICD-10-CM

## 2021-08-31 PROBLEM — E11.22 TYPE 2 DIABETES MELLITUS WITH CHRONIC KIDNEY DISEASE (HCC): Status: ACTIVE | Noted: 2018-01-16

## 2021-08-31 PROCEDURE — G8510 SCR DEP NEG, NO PLAN REQD: HCPCS | Performed by: FAMILY MEDICINE

## 2021-08-31 PROCEDURE — 3046F HEMOGLOBIN A1C LEVEL >9.0%: CPT | Performed by: FAMILY MEDICINE

## 2021-08-31 PROCEDURE — G8417 CALC BMI ABV UP PARAM F/U: HCPCS | Performed by: FAMILY MEDICINE

## 2021-08-31 PROCEDURE — G9231 DOC ESRD DIA TRANS PREG: HCPCS | Performed by: FAMILY MEDICINE

## 2021-08-31 PROCEDURE — 3017F COLORECTAL CA SCREEN DOC REV: CPT | Performed by: FAMILY MEDICINE

## 2021-08-31 PROCEDURE — G8536 NO DOC ELDER MAL SCRN: HCPCS | Performed by: FAMILY MEDICINE

## 2021-08-31 PROCEDURE — 1101F PT FALLS ASSESS-DOCD LE1/YR: CPT | Performed by: FAMILY MEDICINE

## 2021-08-31 PROCEDURE — G0463 HOSPITAL OUTPT CLINIC VISIT: HCPCS | Performed by: FAMILY MEDICINE

## 2021-08-31 PROCEDURE — 2022F DILAT RTA XM EVC RTNOPTHY: CPT | Performed by: FAMILY MEDICINE

## 2021-08-31 PROCEDURE — G8427 DOCREV CUR MEDS BY ELIG CLIN: HCPCS | Performed by: FAMILY MEDICINE

## 2021-08-31 PROCEDURE — 99213 OFFICE O/P EST LOW 20 MIN: CPT | Performed by: FAMILY MEDICINE

## 2021-08-31 RX ORDER — PEN NEEDLE, DIABETIC 30 GX3/16"
NEEDLE, DISPOSABLE MISCELLANEOUS
Qty: 100 EACH | Refills: 12 | Status: SHIPPED | OUTPATIENT
Start: 2021-08-31
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
## 2021-09-01 ENCOUNTER — CLINICAL SUPPORT (OUTPATIENT)
Dept: FAMILY MEDICINE CLINIC | Age: 66
End: 2021-09-01

## 2021-09-01 DIAGNOSIS — E78.00 HYPERCHOLESTEROLEMIA: ICD-10-CM

## 2021-09-01 DIAGNOSIS — I10 ESSENTIAL HYPERTENSION: ICD-10-CM

## 2021-09-01 DIAGNOSIS — Z12.5 PROSTATE CANCER SCREENING: ICD-10-CM

## 2021-09-01 DIAGNOSIS — E11.9 DIABETES MELLITUS WITHOUT COMPLICATION (HCC): ICD-10-CM

## 2021-09-01 DIAGNOSIS — R39.12 WEAK URINARY STREAM: ICD-10-CM

## 2021-09-01 LAB
BASOPHILS # BLD: 0.1 K/UL (ref 0–0.1)
BASOPHILS NFR BLD: 1 % (ref 0–1)
CHOLEST SERPL-MCNC: 218 MG/DL
DIFFERENTIAL METHOD BLD: ABNORMAL
EOSINOPHIL # BLD: 0.1 K/UL (ref 0–0.4)
EOSINOPHIL NFR BLD: 2 % (ref 0–7)
ERYTHROCYTE [DISTWIDTH] IN BLOOD BY AUTOMATED COUNT: 12.2 % (ref 11.5–14.5)
EST. AVERAGE GLUCOSE BLD GHB EST-MCNC: 214 MG/DL
HBA1C MFR BLD: 9.1 % (ref 4–5.6)
HCT VFR BLD AUTO: 38.3 % (ref 36.6–50.3)
HDLC SERPL-MCNC: 38 MG/DL
HDLC SERPL: 5.7 {RATIO} (ref 0–5)
HGB BLD-MCNC: 12 G/DL (ref 12.1–17)
IMM GRANULOCYTES # BLD AUTO: 0.1 K/UL (ref 0–0.04)
IMM GRANULOCYTES NFR BLD AUTO: 1 % (ref 0–0.5)
LDLC SERPL CALC-MCNC: 140.8 MG/DL (ref 0–100)
LYMPHOCYTES # BLD: 1 K/UL (ref 0.8–3.5)
LYMPHOCYTES NFR BLD: 16 % (ref 12–49)
MCH RBC QN AUTO: 34.3 PG (ref 26–34)
MCHC RBC AUTO-ENTMCNC: 31.3 G/DL (ref 30–36.5)
MCV RBC AUTO: 109.4 FL (ref 80–99)
MONOCYTES # BLD: 0.6 K/UL (ref 0–1)
MONOCYTES NFR BLD: 10 % (ref 5–13)
NEUTS SEG # BLD: 4.4 K/UL (ref 1.8–8)
NEUTS SEG NFR BLD: 70 % (ref 32–75)
NRBC # BLD: 0 K/UL (ref 0–0.01)
NRBC BLD-RTO: 0 PER 100 WBC
PLATELET # BLD AUTO: 245 K/UL (ref 150–400)
PMV BLD AUTO: 10.3 FL (ref 8.9–12.9)
PSA SERPL-MCNC: 0.4 NG/ML (ref 0.01–4)
RBC # BLD AUTO: 3.5 M/UL (ref 4.1–5.7)
RBC MORPH BLD: ABNORMAL
RBC MORPH BLD: ABNORMAL
TRIGL SERPL-MCNC: 196 MG/DL (ref ?–150)
VLDLC SERPL CALC-MCNC: 39.2 MG/DL
WBC # BLD AUTO: 6.3 K/UL (ref 4.1–11.1)

## 2021-09-07 RX ORDER — INSULIN DETEMIR 100 [IU]/ML
INJECTION, SOLUTION SUBCUTANEOUS
Qty: 3 ML | Refills: 5 | Status: SHIPPED | OUTPATIENT
Start: 2021-09-07

## 2021-09-07 NOTE — PROGRESS NOTES
pc with pt. Compliance on lipitor stressed. Was taking levemir 6 units daily. Will increase to  14, and continue to  Try to lose weight.

## 2022-02-05 RX ORDER — ATORVASTATIN CALCIUM 80 MG/1
TABLET, FILM COATED ORAL
Qty: 90 TABLET | Refills: 3 | Status: SHIPPED | OUTPATIENT
Start: 2022-02-05

## 2022-03-19 PROBLEM — E11.40 TYPE 2 DIABETES MELLITUS WITH DIABETIC NEUROPATHY (HCC): Status: ACTIVE | Noted: 2018-01-16

## 2022-03-19 PROBLEM — E66.01 OBESITY, MORBID (HCC): Status: ACTIVE | Noted: 2018-01-16

## 2022-03-19 PROBLEM — E11.22 TYPE 2 DIABETES MELLITUS WITH CHRONIC KIDNEY DISEASE (HCC): Status: ACTIVE | Noted: 2018-01-16

## 2022-03-23 RX ORDER — AMLODIPINE BESYLATE 5 MG/1
TABLET ORAL
Qty: 90 TABLET | Refills: 3 | Status: SHIPPED | OUTPATIENT
Start: 2022-03-23

## 2022-03-23 NOTE — TELEPHONE ENCOUNTER
Last visit:8/31/21  Next visit:not scheduled  Previous refill 3/4/21(90+3R)    Requested Prescriptions     Pending Prescriptions Disp Refills    amLODIPine (NORVASC) 5 mg tablet 90 Tablet 3     Sig: TAKE 1 TABLET BY MOUTH DAILY FOR BLOOD PRESSURE

## 2025-05-22 NOTE — PROGRESS NOTES
Chief Complaint   Patient presents with    Other     appetite is back to normal.   pts wife said \"Thank you for keeping us out the hospital\"      1. Have you been to the ER, urgent care clinic since your last visit? Hospitalized since your last visit? No    2. Have you seen or consulted any other health care providers outside of the [address] [place] [address] since your last visit? Include any pap smears or colon screening.  No     Health Maintenance Due   Topic Date Due    Pneumococcal 0-64 years (1 of 3 - PCV13) 08/05/1961    Shingrix Vaccine Age 50> (1 of 2) 08/05/2005    MICROALBUMIN Q1  07/18/2018    FOOT EXAM Q1  01/16/2019    Influenza Age 9 to Adult  08/01/2019    HEMOGLOBIN A1C Q6M  08/19/2019
HISTORY OF PRESENT ILLNESS  Keanu Alvarez is a 59 y.o. male. HPI In for followup. Was seen last week for weakness, and diarrhea. Appetite is good, no nausea, vomiting. ROS    Physical Exam   Constitutional: He appears well-developed and well-nourished. HENT:   Right Ear: External ear normal.   Left Ear: External ear normal.   Mouth/Throat: Oropharynx is clear and moist.   Neck: No thyromegaly present. Cardiovascular: Normal rate, regular rhythm, normal heart sounds and intact distal pulses. Pulmonary/Chest: Effort normal and breath sounds normal. No respiratory distress. He has no wheezes. Abdominal: Soft. Bowel sounds are normal. He exhibits no distension and no mass. There is no tenderness. There is no guarding. Musculoskeletal: Normal range of motion. He exhibits no edema. Lymphadenopathy:     He has no cervical adenopathy. Nursing note and vitals reviewed. ASSESSMENT and PLAN  No orders of the defined types were placed in this encounter. Diagnoses and all orders for this visit:    1.  Gastroenteritis
No